# Patient Record
Sex: FEMALE | Race: WHITE | NOT HISPANIC OR LATINO | Employment: FULL TIME | URBAN - METROPOLITAN AREA
[De-identification: names, ages, dates, MRNs, and addresses within clinical notes are randomized per-mention and may not be internally consistent; named-entity substitution may affect disease eponyms.]

---

## 2018-01-04 ENCOUNTER — HOSPITAL ENCOUNTER (INPATIENT)
Facility: MEDICAL CENTER | Age: 48
LOS: 2 days | End: 2018-01-06
Attending: OBSTETRICS & GYNECOLOGY | Admitting: OBSTETRICS & GYNECOLOGY
Payer: COMMERCIAL

## 2018-01-04 DIAGNOSIS — Z98.890 POST-OPERATIVE STATE: ICD-10-CM

## 2018-01-04 LAB
ALBUMIN SERPL BCP-MCNC: 3 G/DL (ref 3.2–4.9)
ALBUMIN/GLOB SERPL: 1 G/DL
ALP SERPL-CCNC: 89 U/L (ref 30–99)
ALT SERPL-CCNC: 31 U/L (ref 2–50)
ANION GAP SERPL CALC-SCNC: 11 MMOL/L (ref 0–11.9)
APPEARANCE UR: ABNORMAL
APTT PPP: 31.3 SEC (ref 24.7–36)
AST SERPL-CCNC: 66 U/L (ref 12–45)
BACTERIA #/AREA URNS HPF: NEGATIVE /HPF
BASOPHILS # BLD AUTO: 0.2 % (ref 0–1.8)
BASOPHILS # BLD AUTO: 0.4 % (ref 0–1.8)
BASOPHILS # BLD: 0.03 K/UL (ref 0–0.12)
BASOPHILS # BLD: 0.05 K/UL (ref 0–0.12)
BILIRUB SERPL-MCNC: 0.6 MG/DL (ref 0.1–1.5)
BILIRUB UR QL STRIP.AUTO: NEGATIVE
BUN SERPL-MCNC: 13 MG/DL (ref 8–22)
CALCIUM SERPL-MCNC: 9.3 MG/DL (ref 8.5–10.5)
CHLORIDE SERPL-SCNC: 103 MMOL/L (ref 96–112)
CO2 SERPL-SCNC: 21 MMOL/L (ref 20–33)
COLOR UR: YELLOW
COMMENT 1642: NORMAL
CREAT SERPL-MCNC: 0.7 MG/DL (ref 0.5–1.4)
CREAT UR-MCNC: 149.2 MG/DL
EKG IMPRESSION: NORMAL
EOSINOPHIL # BLD AUTO: 0 K/UL (ref 0–0.51)
EOSINOPHIL # BLD AUTO: 0.02 K/UL (ref 0–0.51)
EOSINOPHIL NFR BLD: 0 % (ref 0–6.9)
EOSINOPHIL NFR BLD: 0.2 % (ref 0–6.9)
EPI CELLS #/AREA URNS HPF: ABNORMAL /HPF
ERYTHROCYTE [DISTWIDTH] IN BLOOD BY AUTOMATED COUNT: 42.6 FL (ref 35.9–50)
ERYTHROCYTE [DISTWIDTH] IN BLOOD BY AUTOMATED COUNT: 43.9 FL (ref 35.9–50)
FIBRINOGEN PPP-MCNC: 419 MG/DL (ref 215–460)
GFR SERPL CREATININE-BSD FRML MDRD: >60 ML/MIN/1.73 M 2
GLOBULIN SER CALC-MCNC: 2.9 G/DL (ref 1.9–3.5)
GLUCOSE SERPL-MCNC: 84 MG/DL (ref 65–99)
GLUCOSE UR STRIP.AUTO-MCNC: NEGATIVE MG/DL
HCT VFR BLD AUTO: 38.7 % (ref 37–47)
HCT VFR BLD AUTO: 41.6 % (ref 37–47)
HGB BLD-MCNC: 13.3 G/DL (ref 12–16)
HGB BLD-MCNC: 14.1 G/DL (ref 12–16)
HOLDING TUBE BB 8507: NORMAL
HYALINE CASTS #/AREA URNS LPF: ABNORMAL /LPF
IMM GRANULOCYTES # BLD AUTO: 0.07 K/UL (ref 0–0.11)
IMM GRANULOCYTES # BLD AUTO: 0.13 K/UL (ref 0–0.11)
IMM GRANULOCYTES NFR BLD AUTO: 0.5 % (ref 0–0.9)
IMM GRANULOCYTES NFR BLD AUTO: 1.1 % (ref 0–0.9)
INR PPP: 0.9 (ref 0.87–1.13)
KETONES UR STRIP.AUTO-MCNC: ABNORMAL MG/DL
LEUKOCYTE ESTERASE UR QL STRIP.AUTO: NEGATIVE
LYMPHOCYTES # BLD AUTO: 0.8 K/UL (ref 1–4.8)
LYMPHOCYTES # BLD AUTO: 1.14 K/UL (ref 1–4.8)
LYMPHOCYTES NFR BLD: 6.6 % (ref 22–41)
LYMPHOCYTES NFR BLD: 8.7 % (ref 22–41)
MAGNESIUM SERPL-MCNC: 1.6 MG/DL (ref 1.5–2.5)
MAGNESIUM SERPL-MCNC: 5.1 MG/DL (ref 1.5–2.5)
MAGNESIUM SERPL-MCNC: 6.5 MG/DL (ref 1.5–2.5)
MCH RBC QN AUTO: 30.7 PG (ref 27–33)
MCH RBC QN AUTO: 31.5 PG (ref 27–33)
MCHC RBC AUTO-ENTMCNC: 33.9 G/DL (ref 33.6–35)
MCHC RBC AUTO-ENTMCNC: 34.4 G/DL (ref 33.6–35)
MCV RBC AUTO: 90.6 FL (ref 81.4–97.8)
MCV RBC AUTO: 91.7 FL (ref 81.4–97.8)
MICRO URNS: ABNORMAL
MONOCYTES # BLD AUTO: 0.3 K/UL (ref 0–0.85)
MONOCYTES # BLD AUTO: 0.68 K/UL (ref 0–0.85)
MONOCYTES NFR BLD AUTO: 2.5 % (ref 0–13.4)
MONOCYTES NFR BLD AUTO: 5.2 % (ref 0–13.4)
MORPHOLOGY BLD-IMP: NORMAL
NEUTROPHILS # BLD AUTO: 10.9 K/UL (ref 2–7.15)
NEUTROPHILS # BLD AUTO: 11.21 K/UL (ref 2–7.15)
NEUTROPHILS NFR BLD: 85 % (ref 44–72)
NEUTROPHILS NFR BLD: 89.6 % (ref 44–72)
NITRITE UR QL STRIP.AUTO: NEGATIVE
NRBC # BLD AUTO: 0 K/UL
NRBC # BLD AUTO: 0 K/UL
NRBC BLD-RTO: 0 /100 WBC
NRBC BLD-RTO: 0 /100 WBC
PH UR STRIP.AUTO: 5 [PH]
PLATELET # BLD AUTO: 146 K/UL (ref 164–446)
PLATELET # BLD AUTO: 94 K/UL (ref 164–446)
PMV BLD AUTO: 11.8 FL (ref 9–12.9)
PMV BLD AUTO: 12.4 FL (ref 9–12.9)
POTASSIUM SERPL-SCNC: 3.8 MMOL/L (ref 3.6–5.5)
PROT SERPL-MCNC: 5.9 G/DL (ref 6–8.2)
PROT UR QL STRIP: >=1000 MG/DL
PROT UR-MCNC: 673.8 MG/DL (ref 0–15)
PROT/CREAT UR: 4516 MG/G (ref 10–107)
PROTHROMBIN TIME: 11.9 SEC (ref 12–14.6)
RBC # BLD AUTO: 4.22 M/UL (ref 4.2–5.4)
RBC # BLD AUTO: 4.59 M/UL (ref 4.2–5.4)
RBC # URNS HPF: ABNORMAL /HPF
RBC UR QL AUTO: ABNORMAL
RENAL EPI CELLS #/AREA URNS HPF: ABNORMAL /HPF
SODIUM SERPL-SCNC: 135 MMOL/L (ref 135–145)
SP GR UR STRIP.AUTO: 1.03
TRANS CELLS #/AREA URNS HPF: ABNORMAL /HPF
URATE SERPL-MCNC: 6.8 MG/DL (ref 1.9–8.2)
UROBILINOGEN UR STRIP.AUTO-MCNC: 0.2 MG/DL
WBC # BLD AUTO: 12.2 K/UL (ref 4.8–10.8)
WBC # BLD AUTO: 13.2 K/UL (ref 4.8–10.8)
WBC #/AREA URNS HPF: ABNORMAL /HPF

## 2018-01-04 PROCEDURE — 700105 HCHG RX REV CODE 258: Performed by: OBSTETRICS & GYNECOLOGY

## 2018-01-04 PROCEDURE — 304966 HCHG RECOVERY SVSC TIME ADDL 1/2 HR

## 2018-01-04 PROCEDURE — 88305 TISSUE EXAM BY PATHOLOGIST: CPT

## 2018-01-04 PROCEDURE — 82570 ASSAY OF URINE CREATININE: CPT

## 2018-01-04 PROCEDURE — 80053 COMPREHEN METABOLIC PANEL: CPT

## 2018-01-04 PROCEDURE — 700105 HCHG RX REV CODE 258: Performed by: ANESTHESIOLOGY

## 2018-01-04 PROCEDURE — 88300 SURGICAL PATH GROSS: CPT

## 2018-01-04 PROCEDURE — 83735 ASSAY OF MAGNESIUM: CPT

## 2018-01-04 PROCEDURE — 88302 TISSUE EXAM BY PATHOLOGIST: CPT

## 2018-01-04 PROCEDURE — 770002 HCHG ROOM/CARE - OB PRIVATE (112)

## 2018-01-04 PROCEDURE — 700111 HCHG RX REV CODE 636 W/ 250 OVERRIDE (IP): Performed by: OBSTETRICS & GYNECOLOGY

## 2018-01-04 PROCEDURE — 81001 URINALYSIS AUTO W/SCOPE: CPT

## 2018-01-04 PROCEDURE — 700101 HCHG RX REV CODE 250

## 2018-01-04 PROCEDURE — 85025 COMPLETE CBC W/AUTO DIFF WBC: CPT

## 2018-01-04 PROCEDURE — 700111 HCHG RX REV CODE 636 W/ 250 OVERRIDE (IP)

## 2018-01-04 PROCEDURE — A9270 NON-COVERED ITEM OR SERVICE: HCPCS | Performed by: OBSTETRICS & GYNECOLOGY

## 2018-01-04 PROCEDURE — 85730 THROMBOPLASTIN TIME PARTIAL: CPT

## 2018-01-04 PROCEDURE — 84550 ASSAY OF BLOOD/URIC ACID: CPT

## 2018-01-04 PROCEDURE — 304964 HCHG RECOVERY ROOM TIME 1HR

## 2018-01-04 PROCEDURE — 59514 CESAREAN DELIVERY ONLY: CPT

## 2018-01-04 PROCEDURE — 85610 PROTHROMBIN TIME: CPT

## 2018-01-04 PROCEDURE — 93005 ELECTROCARDIOGRAM TRACING: CPT | Performed by: OBSTETRICS & GYNECOLOGY

## 2018-01-04 PROCEDURE — 0UB90ZZ EXCISION OF UTERUS, OPEN APPROACH: ICD-10-PCS | Performed by: OBSTETRICS & GYNECOLOGY

## 2018-01-04 PROCEDURE — 306828 HCHG ANES-TIME GENERAL: Performed by: OBSTETRICS & GYNECOLOGY

## 2018-01-04 PROCEDURE — 36415 COLL VENOUS BLD VENIPUNCTURE: CPT

## 2018-01-04 PROCEDURE — 84156 ASSAY OF PROTEIN URINE: CPT

## 2018-01-04 PROCEDURE — 700101 HCHG RX REV CODE 250: Performed by: OBSTETRICS & GYNECOLOGY

## 2018-01-04 PROCEDURE — 700102 HCHG RX REV CODE 250 W/ 637 OVERRIDE(OP): Performed by: ANESTHESIOLOGY

## 2018-01-04 PROCEDURE — 85384 FIBRINOGEN ACTIVITY: CPT

## 2018-01-04 PROCEDURE — 700111 HCHG RX REV CODE 636 W/ 250 OVERRIDE (IP): Performed by: ANESTHESIOLOGY

## 2018-01-04 PROCEDURE — 700102 HCHG RX REV CODE 250 W/ 637 OVERRIDE(OP): Performed by: OBSTETRICS & GYNECOLOGY

## 2018-01-04 PROCEDURE — 305385 HCHG SURGICAL SERVICES 1/4 HOUR

## 2018-01-04 RX ORDER — SODIUM CHLORIDE, SODIUM LACTATE, POTASSIUM CHLORIDE, CALCIUM CHLORIDE 600; 310; 30; 20 MG/100ML; MG/100ML; MG/100ML; MG/100ML
INJECTION, SOLUTION INTRAVENOUS CONTINUOUS
Status: DISCONTINUED | OUTPATIENT
Start: 2018-01-04 | End: 2018-01-04

## 2018-01-04 RX ORDER — MAGNESIUM SULFATE HEPTAHYDRATE 40 MG/ML
INJECTION, SOLUTION INTRAVENOUS
Status: COMPLETED
Start: 2018-01-04 | End: 2018-01-04

## 2018-01-04 RX ORDER — DOCUSATE SODIUM 100 MG/1
100 CAPSULE, LIQUID FILLED ORAL 2 TIMES DAILY PRN
Status: DISCONTINUED | OUTPATIENT
Start: 2018-01-04 | End: 2018-01-06 | Stop reason: HOSPADM

## 2018-01-04 RX ORDER — OXYCODONE HYDROCHLORIDE AND ACETAMINOPHEN 5; 325 MG/1; MG/1
1-2 TABLET ORAL EVERY 6 HOURS PRN
Status: DISCONTINUED | OUTPATIENT
Start: 2018-01-04 | End: 2018-01-04

## 2018-01-04 RX ORDER — METOCLOPRAMIDE HYDROCHLORIDE 5 MG/ML
10 INJECTION INTRAMUSCULAR; INTRAVENOUS ONCE
Status: COMPLETED | OUTPATIENT
Start: 2018-01-04 | End: 2018-01-04

## 2018-01-04 RX ORDER — ONDANSETRON 2 MG/ML
4 INJECTION INTRAMUSCULAR; INTRAVENOUS EVERY 6 HOURS PRN
Status: DISCONTINUED | OUTPATIENT
Start: 2018-01-04 | End: 2018-01-04

## 2018-01-04 RX ORDER — DIPHENHYDRAMINE HCL 25 MG
25 TABLET ORAL EVERY 6 HOURS PRN
Status: DISCONTINUED | OUTPATIENT
Start: 2018-01-04 | End: 2018-01-04

## 2018-01-04 RX ORDER — ONDANSETRON 2 MG/ML
4 INJECTION INTRAMUSCULAR; INTRAVENOUS ONCE
Status: CANCELLED | OUTPATIENT
Start: 2018-01-04 | End: 2018-01-05

## 2018-01-04 RX ORDER — MORPHINE SULFATE 4 MG/ML
4 INJECTION, SOLUTION INTRAMUSCULAR; INTRAVENOUS
Status: DISCONTINUED | OUTPATIENT
Start: 2018-01-04 | End: 2018-01-04

## 2018-01-04 RX ORDER — DIPHENHYDRAMINE HYDROCHLORIDE 50 MG/ML
25 INJECTION INTRAMUSCULAR; INTRAVENOUS EVERY 6 HOURS PRN
Status: DISCONTINUED | OUTPATIENT
Start: 2018-01-05 | End: 2018-01-06 | Stop reason: HOSPADM

## 2018-01-04 RX ORDER — KETOROLAC TROMETHAMINE 30 MG/ML
30 INJECTION, SOLUTION INTRAMUSCULAR; INTRAVENOUS EVERY 6 HOURS
Status: DISCONTINUED | OUTPATIENT
Start: 2018-01-04 | End: 2018-01-05

## 2018-01-04 RX ORDER — NALOXONE HYDROCHLORIDE 0.4 MG/ML
0.1 INJECTION, SOLUTION INTRAMUSCULAR; INTRAVENOUS; SUBCUTANEOUS PRN
Status: CANCELLED | OUTPATIENT
Start: 2018-01-04

## 2018-01-04 RX ORDER — IBUPROFEN 600 MG/1
600 TABLET ORAL EVERY 6 HOURS PRN
Status: DISCONTINUED | OUTPATIENT
Start: 2018-01-05 | End: 2018-01-06 | Stop reason: HOSPADM

## 2018-01-04 RX ORDER — DIPHENHYDRAMINE HYDROCHLORIDE 50 MG/ML
12.5 INJECTION INTRAMUSCULAR; INTRAVENOUS EVERY 6 HOURS PRN
Status: DISCONTINUED | OUTPATIENT
Start: 2018-01-04 | End: 2018-01-05

## 2018-01-04 RX ORDER — OXYCODONE AND ACETAMINOPHEN 10; 325 MG/1; MG/1
1 TABLET ORAL EVERY 4 HOURS PRN
Status: DISCONTINUED | OUTPATIENT
Start: 2018-01-04 | End: 2018-01-05

## 2018-01-04 RX ORDER — ONDANSETRON 4 MG/1
4 TABLET, ORALLY DISINTEGRATING ORAL EVERY 6 HOURS PRN
Status: DISCONTINUED | OUTPATIENT
Start: 2018-01-04 | End: 2018-01-04

## 2018-01-04 RX ORDER — SODIUM CHLORIDE, SODIUM LACTATE, POTASSIUM CHLORIDE, CALCIUM CHLORIDE 600; 310; 30; 20 MG/100ML; MG/100ML; MG/100ML; MG/100ML
INJECTION, SOLUTION INTRAVENOUS CONTINUOUS
Status: DISCONTINUED | OUTPATIENT
Start: 2018-01-04 | End: 2018-01-04 | Stop reason: HOSPADM

## 2018-01-04 RX ORDER — HYDRALAZINE HYDROCHLORIDE 20 MG/ML
20 INJECTION INTRAMUSCULAR; INTRAVENOUS ONCE
Status: COMPLETED | OUTPATIENT
Start: 2018-01-04 | End: 2018-01-04

## 2018-01-04 RX ORDER — KETOROLAC TROMETHAMINE 30 MG/ML
30 INJECTION, SOLUTION INTRAMUSCULAR; INTRAVENOUS EVERY 6 HOURS
Status: DISCONTINUED | OUTPATIENT
Start: 2018-01-04 | End: 2018-01-04

## 2018-01-04 RX ORDER — OXYCODONE HYDROCHLORIDE 5 MG/1
5 TABLET ORAL EVERY 4 HOURS PRN
Status: DISCONTINUED | OUTPATIENT
Start: 2018-01-04 | End: 2018-01-04 | Stop reason: HOSPADM

## 2018-01-04 RX ORDER — MORPHINE SULFATE 4 MG/ML
4 INJECTION, SOLUTION INTRAMUSCULAR; INTRAVENOUS
Status: DISCONTINUED | OUTPATIENT
Start: 2018-01-05 | End: 2018-01-06 | Stop reason: HOSPADM

## 2018-01-04 RX ORDER — LABETALOL HYDROCHLORIDE 5 MG/ML
INJECTION, SOLUTION INTRAVENOUS
Status: COMPLETED
Start: 2018-01-04 | End: 2018-01-04

## 2018-01-04 RX ORDER — DIPHENHYDRAMINE HCL 25 MG
25 TABLET ORAL EVERY 6 HOURS PRN
Status: DISCONTINUED | OUTPATIENT
Start: 2018-01-05 | End: 2018-01-06 | Stop reason: HOSPADM

## 2018-01-04 RX ORDER — METOCLOPRAMIDE HYDROCHLORIDE 5 MG/ML
10 INJECTION INTRAMUSCULAR; INTRAVENOUS EVERY 6 HOURS PRN
Status: DISCONTINUED | OUTPATIENT
Start: 2018-01-04 | End: 2018-01-04

## 2018-01-04 RX ORDER — ONDANSETRON 2 MG/ML
4 INJECTION INTRAMUSCULAR; INTRAVENOUS ONCE
Status: COMPLETED | OUTPATIENT
Start: 2018-01-04 | End: 2018-01-04

## 2018-01-04 RX ORDER — SIMETHICONE 80 MG
80 TABLET,CHEWABLE ORAL 4 TIMES DAILY PRN
Status: DISCONTINUED | OUTPATIENT
Start: 2018-01-04 | End: 2018-01-06 | Stop reason: HOSPADM

## 2018-01-04 RX ORDER — MAGNESIUM SULFATE HEPTAHYDRATE 40 MG/ML
4 INJECTION, SOLUTION INTRAVENOUS ONCE
Status: CANCELLED | OUTPATIENT
Start: 2018-01-04 | End: 2018-01-04

## 2018-01-04 RX ORDER — HYDROMORPHONE HYDROCHLORIDE 2 MG/ML
0.4 INJECTION, SOLUTION INTRAMUSCULAR; INTRAVENOUS; SUBCUTANEOUS
Status: DISCONTINUED | OUTPATIENT
Start: 2018-01-04 | End: 2018-01-05

## 2018-01-04 RX ORDER — MAGNESIUM SULFATE HEPTAHYDRATE 40 MG/ML
2 INJECTION, SOLUTION INTRAVENOUS CONTINUOUS
Status: DISCONTINUED | OUTPATIENT
Start: 2018-01-04 | End: 2018-01-04

## 2018-01-04 RX ORDER — MISOPROSTOL 200 UG/1
800 TABLET ORAL
Status: DISCONTINUED | OUTPATIENT
Start: 2018-01-04 | End: 2018-01-06 | Stop reason: HOSPADM

## 2018-01-04 RX ORDER — OXYCODONE HYDROCHLORIDE 10 MG/1
10 TABLET ORAL EVERY 4 HOURS PRN
Status: DISCONTINUED | OUTPATIENT
Start: 2018-01-04 | End: 2018-01-04 | Stop reason: HOSPADM

## 2018-01-04 RX ORDER — KETOROLAC TROMETHAMINE 30 MG/ML
30 INJECTION, SOLUTION INTRAMUSCULAR; INTRAVENOUS EVERY 6 HOURS
Status: CANCELLED | OUTPATIENT
Start: 2018-01-04 | End: 2018-01-05

## 2018-01-04 RX ORDER — LABETALOL HYDROCHLORIDE 5 MG/ML
40 INJECTION, SOLUTION INTRAVENOUS ONCE
Status: COMPLETED | OUTPATIENT
Start: 2018-01-04 | End: 2018-01-04

## 2018-01-04 RX ORDER — MAGNESIUM SULFATE HEPTAHYDRATE 40 MG/ML
2 INJECTION, SOLUTION INTRAVENOUS ONCE
Status: CANCELLED | OUTPATIENT
Start: 2018-01-04 | End: 2018-01-04

## 2018-01-04 RX ORDER — LABETALOL HYDROCHLORIDE 5 MG/ML
20 INJECTION, SOLUTION INTRAVENOUS ONCE
Status: COMPLETED | OUTPATIENT
Start: 2018-01-04 | End: 2018-01-04

## 2018-01-04 RX ORDER — OXYCODONE HYDROCHLORIDE AND ACETAMINOPHEN 5; 325 MG/1; MG/1
1 TABLET ORAL EVERY 4 HOURS PRN
Status: DISCONTINUED | OUTPATIENT
Start: 2018-01-04 | End: 2018-01-05

## 2018-01-04 RX ORDER — VITAMIN A ACETATE, BETA CAROTENE, ASCORBIC ACID, CHOLECALCIFEROL, .ALPHA.-TOCOPHEROL ACETATE, DL-, THIAMINE MONONITRATE, RIBOFLAVIN, NIACINAMIDE, PYRIDOXINE HYDROCHLORIDE, FOLIC ACID, CYANOCOBALAMIN, CALCIUM CARBONATE, FERROUS FUMARATE, ZINC OXIDE, CUPRIC OXIDE 3080; 12; 120; 400; 1; 1.84; 3; 20; 22; 920; 25; 200; 27; 10; 2 [IU]/1; UG/1; MG/1; [IU]/1; MG/1; MG/1; MG/1; MG/1; MG/1; [IU]/1; MG/1; MG/1; MG/1; MG/1; MG/1
1 TABLET, FILM COATED ORAL EVERY MORNING
Status: DISCONTINUED | OUTPATIENT
Start: 2018-01-05 | End: 2018-01-06 | Stop reason: HOSPADM

## 2018-01-04 RX ORDER — MAGNESIUM SULFATE HEPTAHYDRATE 40 MG/ML
4 INJECTION, SOLUTION INTRAVENOUS ONCE
Status: COMPLETED | OUTPATIENT
Start: 2018-01-04 | End: 2018-01-04

## 2018-01-04 RX ORDER — DIPHENHYDRAMINE HYDROCHLORIDE 50 MG/ML
25 INJECTION INTRAMUSCULAR; INTRAVENOUS EVERY 6 HOURS PRN
Status: DISCONTINUED | OUTPATIENT
Start: 2018-01-04 | End: 2018-01-04

## 2018-01-04 RX ORDER — IBUPROFEN 600 MG/1
600 TABLET ORAL EVERY 6 HOURS PRN
Status: DISCONTINUED | OUTPATIENT
Start: 2018-01-04 | End: 2018-01-04

## 2018-01-04 RX ORDER — METOCLOPRAMIDE HYDROCHLORIDE 5 MG/ML
10 INJECTION INTRAMUSCULAR; INTRAVENOUS EVERY 6 HOURS PRN
Status: DISCONTINUED | OUTPATIENT
Start: 2018-01-04 | End: 2018-01-05

## 2018-01-04 RX ORDER — IBUPROFEN 600 MG/1
600 TABLET ORAL EVERY 6 HOURS PRN
Status: DISCONTINUED | OUTPATIENT
Start: 2018-01-05 | End: 2018-01-04

## 2018-01-04 RX ORDER — ONDANSETRON 4 MG/1
4 TABLET, ORALLY DISINTEGRATING ORAL EVERY 6 HOURS PRN
Status: DISCONTINUED | OUTPATIENT
Start: 2018-01-05 | End: 2018-01-06 | Stop reason: HOSPADM

## 2018-01-04 RX ORDER — LABETALOL HYDROCHLORIDE 5 MG/ML
10 INJECTION, SOLUTION INTRAVENOUS
Status: DISCONTINUED | OUTPATIENT
Start: 2018-01-04 | End: 2018-01-05

## 2018-01-04 RX ORDER — METOCLOPRAMIDE HYDROCHLORIDE 5 MG/ML
10 INJECTION INTRAMUSCULAR; INTRAVENOUS EVERY 6 HOURS PRN
Status: DISCONTINUED | OUTPATIENT
Start: 2018-01-05 | End: 2018-01-05

## 2018-01-04 RX ORDER — LEVOTHYROXINE SODIUM 0.05 MG/1
50 TABLET ORAL
COMMUNITY
End: 2022-02-24

## 2018-01-04 RX ORDER — HYDRALAZINE HYDROCHLORIDE 20 MG/ML
INJECTION INTRAMUSCULAR; INTRAVENOUS
Status: COMPLETED
Start: 2018-01-04 | End: 2018-01-04

## 2018-01-04 RX ORDER — ONDANSETRON 2 MG/ML
4 INJECTION INTRAMUSCULAR; INTRAVENOUS EVERY 6 HOURS PRN
Status: DISCONTINUED | OUTPATIENT
Start: 2018-01-04 | End: 2018-01-05

## 2018-01-04 RX ORDER — ACETAMINOPHEN 325 MG/1
325 TABLET ORAL EVERY 4 HOURS PRN
Status: DISCONTINUED | OUTPATIENT
Start: 2018-01-04 | End: 2018-01-04 | Stop reason: HOSPADM

## 2018-01-04 RX ORDER — ONDANSETRON 2 MG/ML
4 INJECTION INTRAMUSCULAR; INTRAVENOUS EVERY 6 HOURS PRN
Status: DISCONTINUED | OUTPATIENT
Start: 2018-01-05 | End: 2018-01-06 | Stop reason: HOSPADM

## 2018-01-04 RX ORDER — SODIUM CHLORIDE, SODIUM LACTATE, POTASSIUM CHLORIDE, CALCIUM CHLORIDE 600; 310; 30; 20 MG/100ML; MG/100ML; MG/100ML; MG/100ML
1500 INJECTION, SOLUTION INTRAVENOUS ONCE
Status: COMPLETED | OUTPATIENT
Start: 2018-01-04 | End: 2018-01-04

## 2018-01-04 RX ORDER — HYDROMORPHONE HYDROCHLORIDE 2 MG/ML
0.2 INJECTION, SOLUTION INTRAMUSCULAR; INTRAVENOUS; SUBCUTANEOUS
Status: DISCONTINUED | OUTPATIENT
Start: 2018-01-04 | End: 2018-01-05

## 2018-01-04 RX ORDER — ONDANSETRON 2 MG/ML
INJECTION INTRAMUSCULAR; INTRAVENOUS
Status: COMPLETED
Start: 2018-01-04 | End: 2018-01-04

## 2018-01-04 RX ORDER — CITRIC ACID/SODIUM CITRATE 334-500MG
30 SOLUTION, ORAL ORAL ONCE
Status: COMPLETED | OUTPATIENT
Start: 2018-01-04 | End: 2018-01-04

## 2018-01-04 RX ORDER — SODIUM CHLORIDE, SODIUM LACTATE, POTASSIUM CHLORIDE, CALCIUM CHLORIDE 600; 310; 30; 20 MG/100ML; MG/100ML; MG/100ML; MG/100ML
INJECTION, SOLUTION INTRAVENOUS PRN
Status: DISCONTINUED | OUTPATIENT
Start: 2018-01-04 | End: 2018-01-06 | Stop reason: HOSPADM

## 2018-01-04 RX ADMIN — MAGNESIUM SULFATE HEPTAHYDRATE 4 G: 40 INJECTION, SOLUTION INTRAVENOUS at 01:19

## 2018-01-04 RX ADMIN — MAGNESIUM SULFATE IN WATER 20 G: 40 INJECTION, SOLUTION INTRAVENOUS at 20:54

## 2018-01-04 RX ADMIN — ONDANSETRON 4 MG: 2 INJECTION INTRAMUSCULAR; INTRAVENOUS at 07:42

## 2018-01-04 RX ADMIN — IBUPROFEN 600 MG: 600 TABLET, FILM COATED ORAL at 06:40

## 2018-01-04 RX ADMIN — HYDRALAZINE HYDROCHLORIDE 20 MG: 20 INJECTION INTRAMUSCULAR; INTRAVENOUS at 01:42

## 2018-01-04 RX ADMIN — MAGNESIUM SULFATE IN WATER 20 G: 40 INJECTION, SOLUTION INTRAVENOUS at 12:22

## 2018-01-04 RX ADMIN — LABETALOL HYDROCHLORIDE 40 MG: 5 INJECTION, SOLUTION INTRAVENOUS at 01:17

## 2018-01-04 RX ADMIN — MAGNESIUM SULFATE HEPTAHYDRATE 2 G/HR: 40 INJECTION, SOLUTION INTRAVENOUS at 01:27

## 2018-01-04 RX ADMIN — SODIUM CHLORIDE, POTASSIUM CHLORIDE, SODIUM LACTATE AND CALCIUM CHLORIDE: 600; 310; 30; 20 INJECTION, SOLUTION INTRAVENOUS at 16:24

## 2018-01-04 RX ADMIN — SODIUM CITRATE AND CITRIC ACID MONOHYDRATE 30 ML: 500; 334 SOLUTION ORAL at 02:33

## 2018-01-04 RX ADMIN — SODIUM CHLORIDE, POTASSIUM CHLORIDE, SODIUM LACTATE AND CALCIUM CHLORIDE: 600; 310; 30; 20 INJECTION, SOLUTION INTRAVENOUS at 05:35

## 2018-01-04 RX ADMIN — METOCLOPRAMIDE 10 MG: 5 INJECTION, SOLUTION INTRAMUSCULAR; INTRAVENOUS at 09:51

## 2018-01-04 RX ADMIN — SODIUM CHLORIDE, POTASSIUM CHLORIDE, SODIUM LACTATE AND CALCIUM CHLORIDE 1000 ML: 600; 310; 30; 20 INJECTION, SOLUTION INTRAVENOUS at 02:37

## 2018-01-04 RX ADMIN — DIPHENHYDRAMINE HYDROCHLORIDE 12.5 MG: 50 INJECTION, SOLUTION INTRAMUSCULAR; INTRAVENOUS at 21:29

## 2018-01-04 RX ADMIN — OXYCODONE AND ACETAMINOPHEN 1 TABLET: 5; 325 TABLET ORAL at 06:40

## 2018-01-04 RX ADMIN — MAGNESIUM SULFATE IN WATER 4 G: 40 INJECTION, SOLUTION INTRAVENOUS at 01:19

## 2018-01-04 RX ADMIN — SODIUM CHLORIDE, POTASSIUM CHLORIDE, SODIUM LACTATE AND CALCIUM CHLORIDE: 600; 310; 30; 20 INJECTION, SOLUTION INTRAVENOUS at 01:18

## 2018-01-04 RX ADMIN — FAMOTIDINE 20 MG: 10 INJECTION, SOLUTION INTRAVENOUS at 02:34

## 2018-01-04 RX ADMIN — LABETALOL HYDROCHLORIDE 20 MG: 5 INJECTION, SOLUTION INTRAVENOUS at 00:50

## 2018-01-04 RX ADMIN — ONDANSETRON 4 MG: 2 INJECTION INTRAMUSCULAR; INTRAVENOUS at 15:44

## 2018-01-04 RX ADMIN — METOCLOPRAMIDE 10 MG: 5 INJECTION, SOLUTION INTRAMUSCULAR; INTRAVENOUS at 02:34

## 2018-01-04 RX ADMIN — KETOROLAC TROMETHAMINE 30 MG: 30 INJECTION, SOLUTION INTRAMUSCULAR at 19:18

## 2018-01-04 ASSESSMENT — LIFESTYLE VARIABLES
ALCOHOL_USE: NO
EVER_SMOKED: NEVER
DO YOU DRINK ALCOHOL: NO

## 2018-01-04 ASSESSMENT — PAIN SCALES - GENERAL
PAINLEVEL_OUTOF10: 3
PAINLEVEL_OUTOF10: 0
PAINLEVEL_OUTOF10: 0
PAINLEVEL_OUTOF10: 10
PAINLEVEL_OUTOF10: 10
PAINLEVEL_OUTOF10: 3
PAINLEVEL_OUTOF10: 0

## 2018-01-04 NOTE — OP REPORT
"DATE OF SERVICE:  2018    OPERATIONS:  1.  Repeat low transverse  section.  2.  Sharp uterine curettage.    SURGEON:  Primitivo Oconnor MD.    ASSISTANT:  Corinne E. Capurro, MD.    ANESTHESIOLOGIST:  Julio Thompson DO.    ANESTHESIA:  Spinal.    PREOPERATIVE DIAGNOSES:  1.  A 34 and 5/7th week gestation.  2.  Preeclampsia with severe features.  3.  Prior  section.  4.  thrombocytopenia    POSTOPERATIVE DIAGNOSES:  1.  A 34 and 5/7th week gestation.  2.  Preeclampsia with severe features.  3.  Prior  section.  4.  Fetus papyraceus.  5.  Placenta accreta.  6.  thrombocytopenia    COMPLICATIONS:  None.    ESTIMATED BLOOD LOSS:  800 mL.    SPECIMENS SENT TO PATHOLOGY:  Placenta and fetus papyraceus.    TRANSFUSIONS:  None.    BABY:  Male, 1-minute Apgar 8 , 5-minute Apgar 9, weight 2050 g.    INDICATIONS:  This is a 47-year-old lady who is now G2, P2.  We do not have   prenatal records, but according to her verbal  history her due date is 02/10/2018   making her EGA 34 and 5/7th weeks.  She reports unusual visual symptoms   including \"prisms\" and scotomata for over 1 day.  She came to labor and   delivery because of dyspnea as well as upper back pain.  She denied abdominal   pain.  She denied headache.  Fetal monitoring was reassuring.  She was   severely hypertensive.  Her initial pressure was 211/96.  Her blood pressure   was eventually controlled with 20 mg of labetalol, then 40 mg of labetalol,   and eventually 20 mg of IV hydralazine.  Once her blood pressure was   controlled and her labs were back, we moved towards expeditious .    She did have a platelet count of 94,000 and elevated liver enzymes,  with an AST   of 66.    We were initially concerned about anuria.  We thought that we placed the Alegre   catheter properly, but the bag did not have any urine in it.  However, further   assessment revealed that we had not placed the Alegre properly.  Then, we   placed the " Alegre properly and clear urine immediately spilled into the tube and    the bag.  Her BUN was 13 and creatinine was 0.7.    OPERATION:  The patient went to the OR, spinal anesthesia was administered.    She was prepped and draped.  A time-out was done.  I made a small Pfannenstiel   skin incision by excising her old Pfannenstiel scar with elliptical   incisions.  I incised a thin layer of subcutaneous fat.  I incised the rectus   abdominis fascia transversely.  I cleared the fascia from the rectus muscles   with scissors.  I entered the peritoneum in the midline well away from the   bladder.  The peritoneal incision was enlarged.  Hilton O retractor was   placed.  The uterus was intact.  There were some anterior cul-de-sac   adhesions, freed up with scissors;  I cleared the bladder from lower   uterine segment with scissors.    I made a low transverse myometrial incision.  I pierced the membranes with a   blunt instrument, encountering clear amniotic fluid.  The baby's head was   extracted from right occiput anterior position with no difficulty.  I bulb   suctioned the baby's mouth and nares.  I wrapped the baby in a towel and a   plastic bag for 1 minute.  After this delayed cord clamping, I triply clamped   and cut the cord and handed the baby off.  Cord gases were sent.    Fundal massage and gentle cord traction failed to deliver the placenta.  I   manually extracted a fair amount of the placenta, but there were multiple   tenaciously adherent placental fragments, cotyledons, and membrane   fragments in the fundus, requiring extensive sharp curettage.  I did note the   presence of a fetus papyraceus.  This was sent to pathology along with all of   the placental fragments.  We spent quite some time manually and instrumentally   curetting all aspects of the uterine fundus until I was satisfied that all of   the placental fragments were excised.    I closed the full thickness of myometrium with running interlocking  0 Vicryl   suture.  I placed a second 0 Vicryl layer, reperitonealizing the anterior   cul-de-sac.  Both fallopian tubes and ovaries were inspected and appeared   normal.  I closed the anterior parietal peritoneum and the posterior layer of   the rectus sheath with running 2-0 Vicryl.  I closed both layers of the rectus   abdominis fascia with running 0 PDS.  I closed Chan's fascia with running   3-0 Vicryl.  I closed the skin with INSORB resorbable subcutaneous staples.    Steri-Strips and a Mepilex dressing were placed.  Sponge and needle counts   were correct.       ____________________________________     MD MATTHEW HDEZ / VASILE    DD:  01/04/2018 04:37:09  DT:  01/04/2018 05:05:21    D#:  [id number]  Job#:  [id number]

## 2018-01-04 NOTE — PROGRESS NOTES
Late entry    Pt arrived to L&D. Prenatals unavailable at the time. Pt reported she is a  with at EMILY of 2/10 making her 34w5d. Pt c/o severe upper chest and back pain, +FM, -VB, -LOF and -UCs at this time. Upon assessment pt had rebound abdominal tenderness, clonus, hyperreflexia, vision changes and severe hypertension. Pt reported no CELIS. Dr Oconnor called to bedside to evaluate. ORders received for EKG, bedside US, IV, labs for preeclampsia workup and labetalol and hydralazine (see mar). Awaiting lab results. After lab results and further evaluation. Dr Oconnor called a repeat  section for preeclampsia at 0200. Pt prepped for  and transported to OR at 0240.     0500- Lab notified about IUP 34w5d placenta being sent down through the dumwaiter. Nika in lab stated she would be there to pick it up.

## 2018-01-04 NOTE — PROGRESS NOTES
Patient transferred from l and d  pacu to  334. Patient denies need for pain medication but is nauseated medication orders put in. Assessment done lochia light fundus firm . Dressing dry and intact.Patient oriented to room and post op and postpartum routine.Patient oriented to [employer] system.Alegre patent with clear yellow urine. I V patent ,without redness or drainage with lr running at 125/hr on pump and magnesium sulfate running on 2nd pump at 2grams /hr.patient c/o feeling flushed and has a dry mouth but declines visual disturbances or RUQ pain.no nasal stuffiness or shortness of breath. Reflexes 2+ bilaterally lower extremities and no clonus.Magnesium side effects and routine discussed. Sequentials on and incentive spirometer at bedside. Any questions answered. Baby in nicu.

## 2018-01-04 NOTE — H&P
"CHIEF COMPLAINT:  Visual symptoms and dyspnea.    HISTORY OF PRESENT ILLNESS:  The patient is a 47-year-old lady, G2, P1.  We do   not have her prenatal records, but she states that her EMILY is 2/10/2018,   which would make her EGA 34-5/7 weeks.  She has had prenatal care with Dr. Perera.  She has seen Dr. Caitlin Maier in consultation.  She reports a   perfectly healthy pregnancy until the last couple of days.  Yesterday, she   began \"seen prisms\" and having some unusual visual symptoms including   scotomata.  Today she became short of breath.  She began having bilateral   upper back pain.  She denies abdominal pain.  She denies headache.  She has   had no vaginal bleeding.  Fetal movement has been present, but decreased.  On   evaluation, she has preeclampsia with severe features, and hypertensive crisis.  Her initial blood pressure was   211/96.  We promptly treated her with labetalol 20 mg IV with minimal effect,   so we gave her 40 mg labetalol, which had some effect, but we finally got a   good blood pressure control with 20 mg of IV hydralazine.  Presently, blood   pressure is 139/71.    Laboratory evaluation reveals thrombocytopenia with platelet count of 94,000,   elevated AST of 66, BUN 13, creatinine 0.7.  Coags are normal.  In spite of   normal BUN and creatinine when we placed a Alegre catheter in preparation for    we noted no urine in the bag, so we suspect intravascular volume   Depletion; IV hydration is in progress in anticipation of .  The   baby does present cephalically.  She had 1 prior  in 2015 for   malpresentation.      OBSTETRICAL HISTORY:  Primary  for malpresentation .    PAST MEDICAL HISTORY:  Unremarkable.  She specifically denies any history of   heart, lung, liver, or thromboembolic disease.    PAST SURGICAL HISTORY:  Primary  2015.  She had some sort of foot   surgery as a small child.    ALLERGIES:  No known drug " allergies.    SOCIAL HISTORY:  She is a hairdresser.  She denies alcohol, tobacco or drug   consumption.    PHYSICAL EXAMINATION:  VITAL SIGNS:  Temperature 97.2, pulse 71, respirations 16, O2 saturation on   room air is 99%.  Initial blood pressure 211/96.  Subsequently, 186/91 161/86,   169/86 and most recently 139/71 after labetalol and hydralazine.  HEENT:  Normal.  LUNGS:  Clear.  HEART:  Sounds normal.  ABDOMEN:  Nontender.  No hepatosplenomegaly.  Uterus is nontender.  PELVIC:  Not done.  EXTREMITIES:  3+ bipedal edema.  Homans negative bilaterally.  Prior to   starting magnesium sulfate seizure prophylaxis her reflexes were extremely   brisk with 3 beats of clonus.    Fetal monitoring is reassuring at this point.  Bedside ultrasound reveals   cephalic presentation, normal amniotic fluid volume with a left fundal   placenta with no sonographic evidence of placental abruption.    LABORATORY DATA:  Hemoglobin 14.1, hematocrit 41.6, white blood count 13,200,   platelet count 94,000.  Electrolytes are within normal limits.  BUN 13,   creatinine 0.7, AST 66, ALT 31, uric acid 6.8.  PT 11.9 seconds, PTT 31.3   seconds.  Fibrinogen 419.  EKG is within normal limits.  There are no ST   segment changes.  There are no inverted T waves.    DIAGNOSES:  1.  A 34-5/7 week gestation.  2.  Prior  section.  3.  Preeclampsia with severe features, acute hypertensive crisis, thrombocytopenia,   elevated liver enzymes, and oliguria.    PLAN:  After stabilization and hydration, proceed with an expeditious   .  Magnesium sulfate seizure prophylaxis is already underway, IV   hydration is underway.  She has not been planning sterilization;  I do not   think this is the time to make a spur of the moment decision in that regard.       ____________________________________     MD MATTHEW HDEZ / VASILE    DD:  2018 02:17:56  DT:  2018 03:01:25    D#:  [id number]  Job#:  [id number]

## 2018-01-04 NOTE — CONSULTS
Importance of initiating breast stimulation and pumping discussed with mother, Hilton. She has a history of low milk production, tried using Reglan but did not tolerate side effects well. Explained colostrum swabbing for oral care for infant.

## 2018-01-04 NOTE — PROGRESS NOTES
ADMIT H AND P DICTATED    34 5/7 wks  Severe preeclampsia---MgSO4 therapy begun  Hypertensive crisis---labetalol 20 then 40 mg, hydralazine 20 mg IV---now  BP  139/71  Thrombocytopenia---94  Elevated AST---66  Oliguria---no urine in julio bag, BUN and creat WNL---hydration in progress  Prior     PLAN:      Stabilize, hydrate, MgSO4, hydralazine PRN, deliver by expeditious , PP MgSO4.

## 2018-01-04 NOTE — OR SURGEON
Immediate Post OP Note    PreOp Diagnosis:     34 5/7 wks  Severe preeclampsia  Prior       PostOp Diagnosis:     Same, plus  Fetus papyraceus  Placenta accreta    Procedure(s):    Repeat LTCS  Sharp curettage    Surgeon(s):  Bruce E Farringer, M.D. Corinne E Capurro, M.D.    Anesthesiologist/Type of Anesthesia:  No anesthesia staff entered./Spinal    Surgical Staff:  * No surgical staff found *    Specimens:  Placenta and fetus papyraceus    Estimated Blood Loss: 800 cc    Findings:     Baby:  Male, APGARs 8-9, 2050 grams  Placenta accreta--- fundus    Complications: none    PLAN:  PP MgSO4, labetalol PRN.        2018 4:12 AM Primitivo Oconnor

## 2018-01-04 NOTE — PROGRESS NOTES
0700 - Report received from DEBBY An RN. Pt resting in [facility] s/p c/s. Alegre in place. Magnesium running per active order. Pt rating her pain at 5-6 on scale of 0-10. Pt reporting mild dizziness after sitting up, head of bed lowered to pt satisfaction. POC discussed with pt, questions answered.   0740 - Dr. Oconnor updated. No new orders.   0800 - Home from Lab called with critical result of magnesium at 5.1. Critical lab result read back to Home.   This critical lab result is within parameters established by  for this patient  0830 - Pt transferred from PACU bed 3 to S 334 via [facility] in stable condition, possessions at bedside. Report to COREEN Sullivan

## 2018-01-05 LAB
ERYTHROCYTE [DISTWIDTH] IN BLOOD BY AUTOMATED COUNT: 44.7 FL (ref 35.9–50)
HCT VFR BLD AUTO: 37 % (ref 37–47)
HGB BLD-MCNC: 12.6 G/DL (ref 12–16)
MCH RBC QN AUTO: 31.3 PG (ref 27–33)
MCHC RBC AUTO-ENTMCNC: 34.1 G/DL (ref 33.6–35)
MCV RBC AUTO: 91.8 FL (ref 81.4–97.8)
PLATELET # BLD AUTO: 187 K/UL (ref 164–446)
PMV BLD AUTO: 11.6 FL (ref 9–12.9)
RBC # BLD AUTO: 4.03 M/UL (ref 4.2–5.4)
WBC # BLD AUTO: 13.1 K/UL (ref 4.8–10.8)

## 2018-01-05 PROCEDURE — 700112 HCHG RX REV CODE 229: Performed by: OBSTETRICS & GYNECOLOGY

## 2018-01-05 PROCEDURE — A9270 NON-COVERED ITEM OR SERVICE: HCPCS | Performed by: OBSTETRICS & GYNECOLOGY

## 2018-01-05 PROCEDURE — 700102 HCHG RX REV CODE 250 W/ 637 OVERRIDE(OP): Performed by: OBSTETRICS & GYNECOLOGY

## 2018-01-05 PROCEDURE — 770002 HCHG ROOM/CARE - OB PRIVATE (112)

## 2018-01-05 PROCEDURE — 36415 COLL VENOUS BLD VENIPUNCTURE: CPT

## 2018-01-05 PROCEDURE — 85027 COMPLETE CBC AUTOMATED: CPT

## 2018-01-05 PROCEDURE — 700102 HCHG RX REV CODE 250 W/ 637 OVERRIDE(OP): Performed by: ANESTHESIOLOGY

## 2018-01-05 PROCEDURE — A9270 NON-COVERED ITEM OR SERVICE: HCPCS | Performed by: ANESTHESIOLOGY

## 2018-01-05 RX ORDER — OXYCODONE HYDROCHLORIDE AND ACETAMINOPHEN 5; 325 MG/1; MG/1
1 TABLET ORAL EVERY 4 HOURS PRN
Status: DISCONTINUED | OUTPATIENT
Start: 2018-01-05 | End: 2018-01-06 | Stop reason: HOSPADM

## 2018-01-05 RX ORDER — OXYCODONE AND ACETAMINOPHEN 10; 325 MG/1; MG/1
1 TABLET ORAL EVERY 4 HOURS PRN
Status: DISCONTINUED | OUTPATIENT
Start: 2018-01-05 | End: 2018-01-06 | Stop reason: HOSPADM

## 2018-01-05 RX ADMIN — IBUPROFEN 600 MG: 600 TABLET, FILM COATED ORAL at 23:53

## 2018-01-05 RX ADMIN — OXYCODONE HYDROCHLORIDE AND ACETAMINOPHEN 1 TABLET: 10; 325 TABLET ORAL at 05:41

## 2018-01-05 RX ADMIN — OXYCODONE HYDROCHLORIDE AND ACETAMINOPHEN 1 TABLET: 5; 325 TABLET ORAL at 17:19

## 2018-01-05 RX ADMIN — IBUPROFEN 600 MG: 600 TABLET, FILM COATED ORAL at 17:18

## 2018-01-05 RX ADMIN — OXYCODONE HYDROCHLORIDE AND ACETAMINOPHEN 1 TABLET: 10; 325 TABLET ORAL at 21:36

## 2018-01-05 RX ADMIN — Medication 1 TABLET: at 08:24

## 2018-01-05 RX ADMIN — SIMETHICONE CHEW TAB 80 MG 80 MG: 80 TABLET ORAL at 11:14

## 2018-01-05 RX ADMIN — DOCUSATE SODIUM 100 MG: 100 CAPSULE ORAL at 04:51

## 2018-01-05 RX ADMIN — IBUPROFEN 600 MG: 600 TABLET, FILM COATED ORAL at 04:50

## 2018-01-05 RX ADMIN — IBUPROFEN 600 MG: 600 TABLET, FILM COATED ORAL at 11:14

## 2018-01-05 RX ADMIN — DOCUSATE SODIUM 100 MG: 100 CAPSULE ORAL at 17:18

## 2018-01-05 RX ADMIN — SIMETHICONE CHEW TAB 80 MG 80 MG: 80 TABLET ORAL at 17:18

## 2018-01-05 RX ADMIN — SIMETHICONE CHEW TAB 80 MG 80 MG: 80 TABLET ORAL at 04:50

## 2018-01-05 RX ADMIN — OXYCODONE HYDROCHLORIDE AND ACETAMINOPHEN 1 TABLET: 10; 325 TABLET ORAL at 11:25

## 2018-01-05 ASSESSMENT — PAIN SCALES - GENERAL
PAINLEVEL_OUTOF10: 6
PAINLEVEL_OUTOF10: 3
PAINLEVEL_OUTOF10: 3
PAINLEVEL_OUTOF10: 4
PAINLEVEL_OUTOF10: 2
PAINLEVEL_OUTOF10: 4
PAINLEVEL_OUTOF10: 0
PAINLEVEL_OUTOF10: 0
PAINLEVEL_OUTOF10: 7
PAINLEVEL_OUTOF10: 6
PAINLEVEL_OUTOF10: 6
PAINLEVEL_OUTOF10: 2

## 2018-01-05 ASSESSMENT — PATIENT HEALTH QUESTIONNAIRE - PHQ9
SUM OF ALL RESPONSES TO PHQ QUESTIONS 1-9: 0
SUM OF ALL RESPONSES TO PHQ9 QUESTIONS 1 AND 2: 0
1. LITTLE INTEREST OR PLEASURE IN DOING THINGS: NOT AT ALL
2. FEELING DOWN, DEPRESSED, IRRITABLE, OR HOPELESS: NOT AT ALL

## 2018-01-05 ASSESSMENT — COPD QUESTIONNAIRES
COPD SCREENING SCORE: 0
DURING THE PAST 4 WEEKS HOW MUCH DID YOU FEEL SHORT OF BREATH: NONE/LITTLE OF THE TIME
HAVE YOU SMOKED AT LEAST 100 CIGARETTES IN YOUR ENTIRE LIFE: NO/DON'T KNOW
DO YOU EVER COUGH UP ANY MUCUS OR PHLEGM?: NO/ONLY WITH OCCASIONAL COLDS OR INFECTIONS

## 2018-01-05 NOTE — PROGRESS NOTES
Attempted to get patient up to ambulate but patient became very nauseated and vomited. Got patient to side of bed but continued nausea and vomiting. Didn't attempt to ambulate further, assisted back to bed.vomiting clear fluid.

## 2018-01-05 NOTE — PROGRESS NOTES
Dr. Crook ordered percocet 5/325mg PO EVERY 4 HOURS PRN for Moderate Pain, pain scale 4-6 and percocet 10/325mg PO EVERY 4 HOURS PRN for Severe Pain, pain scale 7-10

## 2018-01-05 NOTE — PROGRESS NOTES
Follow up with MOB. Primary RN started MOB using the breastpump. Reviewed settings, MOB denies pain when using pump. She has HTH, and will plan to get insurance pump. Recommended to rent HG pump to help establish supply for infant in NICU. Rental info given. Ongoing lactation support offered.

## 2018-01-05 NOTE — PROGRESS NOTES
Pt assessment complete, wnl. Fundus firm with minimal discharge.  Pt ambulating and voiding without difficulty. Bonding well with infant in NICU, pumping. Plan of care discussed with patient for the day. Questions answered. Encouraged to call with needs, will monitor.

## 2018-01-05 NOTE — PROGRESS NOTES
2000 Received awake on bed with on going IVF of LR and Magnesium Sulfate infusing in different pump in progress, with julio catheter connected to urine bag draining to clear urine output, with sequential stocking bilaterally, Assessment done wound covered wit Mepilex clean dry and intact, Pt denies pain at this time, Encourage early mobilization, Needs attended.

## 2018-01-05 NOTE — PROGRESS NOTES
"Teaching on pump settings speed 80 (decrease once milk flows to 50-60), suction 40 (to comfort), x 10-15 minutes, every 2-3 hours or 8-10 pump sessions per day. Mother has [facility] and has paperwork to pick-up HG pump at [facility] (called [facility] and placed on hold for patient). \"Breastfeeding Essentials\" pamphlet provided with review. NICU breastfeeding pathway sheet given.   "

## 2018-01-05 NOTE — CARE PLAN
Problem: Potential for postpartum infection related to surgical incision, compromised uterine condition, urinary tract or respiratory compromise  Goal: Patient will be afebrile and free from signs and symptoms of infection  Outcome: PROGRESSING AS EXPECTED  Patient vitals stable.temp wnl. Patient has no fever or chills. Pads changed every 3-4 hours. Cbc shows no signs of infection.

## 2018-01-05 NOTE — DISCHARGE PLANNING
Medical Social Work    Infant: Still no name     SW met with MOB at bedside to complete assessment.      Plan:  SW to follow and assist as needed.     Discharge Planning Assessment Post Partum     Reason for Referral: NICU- 34 weeks 5 d  Address[place] [id number]  Type of Living Situation: Lives with FOB   Mom Diagnosis: Pregnancy  Baby Diagnosis: Respiratory distress of , prematurity  Primary Language: English     Father of the Baby: Gene Lemos  Involved in baby’s care? Yes  FOB'S : 1968   Prenatal Care: Yes  Mom's PCP: No PCP  PCP for new baby: Dr. Wiley     Support System: FOB   Source of Feeding: Pumping  Supplies for Infant: Has all needed supplies     Mom's Insurance: [employer]  Baby Covered on Insurance: Yes  Mother Employed/School: [employer]  Other children in the home/names & ages: Rosalba (2)  Mom's Mental status: A&Ox4     Resources Provided: None  Referrals Made: None

## 2018-01-05 NOTE — PROGRESS NOTES
Progress Note    Hilton Lemos   Post-op day 1  Chief Admitting Dx: Pregnancy   H/O:  section  Preeclampsia  Delivery Type:  for repeat at 34 weeks with severe PIH      Subjective:  Pt was on MagSo4 x 24 hours and now off. Pt without PIH sx. Pt is pumping for baby in ICN. Pt with good pain control.  Ambulating: yes  Tolerating oral feed: yes  Flatus: no   Voiding: yes  Dizziness: no  Vitals:    18 0100 18 0200 18 0300 18 0400   BP: 115/71 122/69 129/79 134/80   Pulse: 78 76 81 79   Resp:    Temp:    36.5 °C (97.7 °F)   TempSrc:       SpO2: 95% 96% 95% 93%   Weight:       Height:         UO: 3,175 cc x 12 hrs    Exam:  Gen: NAD  Abdomen: Abdomen soft, non-tender. BS normal. No masses,  No organomegaly  Incision: dry and intact, dressing in place  Fundus Tenderness: no  Below umbilicus: yes  Perineum: perineum intact  Extremities: 2+ edema  Lochia: mild    Labs:   Recent Results (from the past 24 hour(s))   URINALYSIS    Collection Time: 18  7:10 AM   Result Value Ref Range    Color Yellow     Character Cloudy (A)     Specific Gravity 1.030 <1.035    Ph 5.0 5.0 - 8.0    Glucose Negative Negative mg/dL    Ketones Trace (A) Negative mg/dL    Protein >=1000 (A) Negative mg/dL    Bilirubin Negative Negative    Urobilinogen, Urine 0.2 Negative    Nitrite Negative Negative    Leukocyte Esterase Negative Negative    Occult Blood Small (A) Negative    Micro Urine Req Microscopic    PROTEIN/CREAT RATIO URINE    Collection Time: 18  7:10 AM   Result Value Ref Range    Total Protein, Urine 673.8 (H) 0.0 - 15.0 mg/dL    Creatinine, Random Urine 149.20 mg/dL    Protein Creatinine Ratio 4516 (H) 10 - 107 mg/g   URINE MICROSCOPIC (W/UA)    Collection Time: 18  7:10 AM   Result Value Ref Range    WBC 0-2 /hpf    RBC 2-5 (A) /hpf    Bacteria Negative None /hpf    Epithelial Cells Many (A) /hpf    Epithelial Cells Renal Few /hpf    Trans Epithelial Cells  Few /hpf    Hyaline Cast 3-5 (A) /lpf   SERUM MAGNESIUM LEVELS EVERY 6 HRS UNTIL DESIRED RANGE (5-8 MG/DL) ACHIEVED    Collection Time: 01/04/18 12:32 PM   Result Value Ref Range    Magnesium 6.5 (HH) 1.5 - 2.5 mg/dL   CBC without differential    Collection Time: 01/05/18  5:53 AM   Result Value Ref Range    WBC 13.1 (H) 4.8 - 10.8 K/uL    RBC 4.03 (L) 4.20 - 5.40 M/uL    Hemoglobin 12.6 12.0 - 16.0 g/dL    Hematocrit 37.0 37.0 - 47.0 %    MCV 91.8 81.4 - 97.8 fL    MCH 31.3 27.0 - 33.0 pg    MCHC 34.1 33.6 - 35.0 g/dL    RDW 44.7 35.9 - 50.0 fL    Platelet Count 187 164 - 446 K/uL    MPV 11.6 9.0 - 12.9 fL       Assessment:  Continue Routine postpartum care      Plan:  Encourange Ambulation, SHASHANK Crook M.D.

## 2018-01-05 NOTE — CARE PLAN
Problem: Alteration in comfort related to surgical incision and/or after birth pains  Goal: Patient verbalizes acceptable pain level  Outcome: PROGRESSING AS EXPECTED  Patient states pain is tolerable with pain medication.  Will continue to reassess with hourly rounding and medicate accordingly to 0-10 pain scale.    Problem: Potential anxiety related to difficulty adapting to parental role  Goal: Patient will verbalize and demonstrate effective bonding and parenting behavior  Outcome: PROGRESSING AS EXPECTED  Patient is bonding well with baby in NICU, pumping, going to NICU to visit baby.

## 2018-01-05 NOTE — PROGRESS NOTES
Spoke with patient and confirmed understanding with patient that she could call nicu with any concerns and as soon as she felt ready to go see baby to let us know. Due to nausea earlier patient not anxious to get up now, but was confident her baby was in good hands and when she was feeling better she would let us know.

## 2018-01-05 NOTE — CARE PLAN
Problem: Altered physiologic condition related to postoperative  delivery  Goal: Patient physiologically stable as evidenced by normal lochia, palpable uterine involution and vital signs within normal limits  Outcome: PROGRESSING AS EXPECTED  Fundus firm, lochia light,blood pressure and other vitals stable.  Patient encouraged increase intake of fluids .

## 2018-01-06 VITALS
SYSTOLIC BLOOD PRESSURE: 159 MMHG | BODY MASS INDEX: 25.31 KG/M2 | TEMPERATURE: 97.9 F | RESPIRATION RATE: 20 BRPM | OXYGEN SATURATION: 96 % | DIASTOLIC BLOOD PRESSURE: 81 MMHG | WEIGHT: 167 LBS | HEIGHT: 68 IN | HEART RATE: 70 BPM

## 2018-01-06 PROCEDURE — A9270 NON-COVERED ITEM OR SERVICE: HCPCS | Performed by: OBSTETRICS & GYNECOLOGY

## 2018-01-06 PROCEDURE — 700102 HCHG RX REV CODE 250 W/ 637 OVERRIDE(OP): Performed by: OBSTETRICS & GYNECOLOGY

## 2018-01-06 RX ORDER — SODIUM CHLORIDE, SODIUM LACTATE, POTASSIUM CHLORIDE, CALCIUM CHLORIDE 600; 310; 30; 20 MG/100ML; MG/100ML; MG/100ML; MG/100ML
INJECTION, SOLUTION INTRAVENOUS CONTINUOUS
Status: DISCONTINUED | OUTPATIENT
Start: 2018-01-06 | End: 2018-01-06

## 2018-01-06 RX ORDER — OXYCODONE HYDROCHLORIDE AND ACETAMINOPHEN 5; 325 MG/1; MG/1
1-2 TABLET ORAL EVERY 4 HOURS PRN
Qty: 30 TAB | Refills: 0 | Status: SHIPPED | OUTPATIENT
Start: 2018-01-06 | End: 2018-01-13

## 2018-01-06 RX ORDER — IBUPROFEN 600 MG/1
600 TABLET ORAL EVERY 6 HOURS PRN
Qty: 30 TAB | Refills: 3 | Status: SHIPPED | OUTPATIENT
Start: 2018-01-06 | End: 2022-02-24

## 2018-01-06 RX ADMIN — IBUPROFEN 600 MG: 600 TABLET, FILM COATED ORAL at 09:36

## 2018-01-06 RX ADMIN — Medication 1 TABLET: at 07:35

## 2018-01-06 RX ADMIN — OXYCODONE HYDROCHLORIDE AND ACETAMINOPHEN 1 TABLET: 5; 325 TABLET ORAL at 07:35

## 2018-01-06 RX ADMIN — OXYCODONE HYDROCHLORIDE AND ACETAMINOPHEN 1 TABLET: 5; 325 TABLET ORAL at 03:30

## 2018-01-06 ASSESSMENT — PAIN SCALES - GENERAL
PAINLEVEL_OUTOF10: 2
PAINLEVEL_OUTOF10: 6
PAINLEVEL_OUTOF10: 6
PAINLEVEL_OUTOF10: 3
PAINLEVEL_OUTOF10: 4

## 2018-01-06 ASSESSMENT — LIFESTYLE VARIABLES: EVER_SMOKED: NEVER

## 2018-01-06 NOTE — DISCHARGE SUMMARY
DATE OF ADMISSION:  2018    DATE OF DISCHARGE:  2018    ADMITTING DIAGNOSES:   1.  Intrauterine pregnancy at 34-5/7 weeks.   2.  Prior  section.  3.  Prior twin gestation with demise of 1 twin at 16 weeks.  4.  Severe preeclampsia.     Please see previously dictated history and physical.    HOSPITAL COURSE:  The patient was admitted to labor and delivery on 2018   with complaints of visual symptoms and dyspnea.  She was found to have a   blood pressure of 211/96.  She was given labetalol and hydralazine and had   laboratory function tests consistent with HELLP syndrome.  She subsequently   underwent a repeat lower uterine segment transverse  section.  She was   noted to have fetus papyraceus, again the demise of a 16-week twin and a mild   placenta accreta.  She did not end up having  hysterectomy, however.    She underwent procedure with an estimated blood loss of 800 mL.  She had a   viable male infant, weight 2050 grams.  Apgars were 8 and 9.  Patient's   postpartum and postoperative course was significant for requiring magnesium   sulfate for 24 hours.  Once her magnesium sulfate was discontinued, she was   without symptoms, she is voiding well and pumping for the baby in the ICN and   was requesting to go home on postoperative day 2.  Her blood pressures have   ranged from 130s-150s/70s-90s.  She denies any symptoms of right upper   quadrant pain and shortness of breath.    PHYSICAL EXAMINATION:   VITAL SIGNS:  On discharge, temperature is 97.9, pulse 70, blood pressure   again systolic 135-159, diastolic 74-90.  GENERAL:  She is pleasant, in no apparent distress.   ABDOMEN:  Soft and nontender.  Her fundus is firm below the umbilicus.  Her   bowel sounds are positive and normoactive.  Her incision dressing is dry.  EXTREMITIES:  Show +1 pedal edema.  She has no cords or Homans sign.    Her final H and H was 12 and 37.  Her platelet count increased to 187,000 from   a  low of 94,000 on admission.  Her liver function tests were not repeated,   but her AST was 66 and ALT was 31 on admission.    IMPRESSION:  This is a 47-year-old  2, para 2, who is status post   repeat lower uterine segment transverse  section for severe   preeclampsia and thrombocytopenia consistent with HELLP syndrome.    PLAN:  1.  Patient will be discharged home with instructions to follow up with Dr. Perera in 2 weeks.  2.  She has been a blood pressure cuff and blood pressure parameters have been   discussed with the patient.  3.  Patient is given a prescription for Percocet and ibuprofen.  4.  Patient is instructed on no lifting over 10 pounds for 6 weeks, no driving   for 2 weeks, and pelvic rest for 6 weeks.  5.  She is encouraged to continue prenatal vitamins while breastfeeding.       ____________________________________     MD RENETTA DUPREE / VASILE    DD:  2018 08:33:20  DT:  2018 09:49:25    D#:  [id number]  Job#:  [id number]    cc: SKYLER ALONSO MD, WILNER CRUZ MD

## 2018-01-06 NOTE — DISCHARGE INSTRUCTIONS
"POSTPARTUM DISCHARGE INSTRUCTIONS FOR MOM    YOB: 1970   Age: 47 y.o.               Admit Date: 2018     Discharge Date: 2018  Attending Doctor:  Lisa Perera M.D.                  Allergies:  Patient has no known allergies.    Be sure to schedule a follow-up appointment with your primary care doctor or any specialists as instructed.     REASONS TO CALL YOUR OBSTETRICIAN:  1.   Persistent fever or shaking chills (Temperature higher than 100.4)  2.   Heavy bleeding (soaking more than 1 pad per hour); Passing clots  3.   Foul odor from vagina  4.   Mastitis (Breast infection; breast pain, chills, fever, redness)  5.   Urinary pain, burning or frequency  6.   Abdominal incision infection  7.   Severe depression longer than 24 hours    HAND WASHING  · Prior to handling the baby.  · Before breastfeeding or bottle feeding baby.  · After using the bathroom or changing the baby's diaper.    WOUND CARE  Ask your physician for additional care instructions.  In general:    ·  Incision:      · Keep clean and dry.    · Do NOT lift anything heavier than your baby for up to 6 weeks.    · There should not be any opening or pus.      VAGINAL CARE  · Nothing inside vagina for 6 weeks: no sexual intercourse, tampons or douching.  · Bleeding may continue for 2-4 weeks.  Amount may vary.    · Call your physician for heavy bleeding which means soaking more than 1 pad per hour    BIRTH CONTROL  · It is possible to become pregnant at any time after delivery and while breastfeeding.  · Plan to discuss a method of birth control with your physician at your follow up visit. visit.    DIET AND ELIMINATION  · Eating more fiber (bran cereal, fruits, and vegetables) and drinking plenty of fluids will help to avoid constipation.  · Urinary frequency after childbirth is normal.    POSTPARTUM BLUES  During the first few days after birth, you may experience a sense of the \"blues\" which may include impatience, irritability " "or even crying.  These feeling come and go quickly.  However, as many as 1 in 10 women experience emotional symptoms known as postpartum depression.    Postpartum depression:  May start as early as the second or third day after delivery or take several weeks or months to develop.  Symptoms of \"blues\" are present, but are more intense:  Crying spells; loss of appetite; feelings of hopelessness or loss of control; fear of touching the baby; over concern or no concern at all about the baby; little or no concern about your own appearance/caring for yourself; and/or inability to sleep or excessive sleeping.  Contact your physician if you are experiencing any of these symptoms.    Crisis Hotline:  · [place] Crisis Hotline:  8-187-RSDXDRT  Or 1-630.505.7562  · [place] Crisis Hotline:  1-661.538.2439  Or 131-439-3329    PREVENTING SHAKEN BABY:  If you are angry or stressed, PUT THE BABY IN THE CRIB, step away, take some deep breaths, and wait until you are calm to care for the baby.  DO NOT SHAKE THE BABY.  You are not alone, call a supporter for help.    · Crisis Call Center 24/7 crisis line 053-431-2088 or 1-915.616.7500  · You can also text them, text \"ANSWER\" to 889830    QUIT SMOKING/TOBACCO USE:  I understand the use of any tobacco products increases my chance of suffering from future heart disease and could cause other illnesses which may shorten my life. Quitting the use of tobacco products is the single most important thing I can do to improve my health. For further information on smoking / tobacco cessation call a Toll Free Quit Line at 1-308.324.5174 (*[employer] [place]) or 1-568.293.1844 (American Lung Association) or you can access the web based program at www.lungusa.org.    · [place] Tobacco Users Help Line:  (322) 828-8367       Toll Free: 1-331.651.4172  · Quit Tobacco Program [facility] (802)440-9835    DEPRESSION / SUICIDE RISK:  As you are discharged from this [facility]" facility, it is important to learn how to keep safe from harming yourself.    Recognize the warning signs:  · Abrupt changes in personality, positive or negative- including increase in energy   · Giving away possessions  · Change in eating patterns- significant weight changes-  positive or negative  · Change in sleeping patterns- unable to sleep or sleeping all the time   · Unwillingness or inability to communicate  · Depression  · Unusual sadness, discouragement and loneliness  · Talk of wanting to die  · Neglect of personal appearance   · Rebelliousness- reckless behavior  · Withdrawal from people/activities they love  · Confusion- inability to concentrate     If you or a loved one observes any of these behaviors or has concerns about self-harm, here's what you can do:  · Talk about it- your feelings and reasons for harming yourself  · Remove any means that you might use to hurt yourself (examples: pills, rope, extension cords, firearm)  · Get professional help from the community (Mental Health, Substance Abuse, psychological counseling)  · Do not be alone:Call your Safe Contact- someone whom you trust who will be there for you.  · Call your local CRISIS HOTLINE 698-0773 or 236-759-3582  · Call your local Children's Mobile Crisis Response Team [place] (470) 033-2548 or www.GaBoom  · Call the toll free National Suicide Prevention Hotlines   · National Suicide Prevention Lifeline 320-678-QWAL (3763)  · National Hope Line Network 800-SUICIDE (229-9477)    DISCHARGE SURVEY:  Thank you for choosing [facility].  We hope we provided you with very good care.  You may be receiving a survey in the mail.  Please fill it out.  Your opinion is valuable to us.    ADDITIONAL EDUCATIONAL MATERIALS GIVEN TO PATIENT:        My signature on this form indicates that:  1.  I have reviewed and understand the above information  2.  My questions regarding this information have been answered to my satisfaction.  3.  I  have formulated a plan with my discharge nurse to obtain my prescribed medication for home.

## 2018-01-06 NOTE — CARE PLAN
Problem: Altered physiologic condition related to postoperative  delivery  Goal: Patient physiologically stable as evidenced by normal lochia, palpable uterine involution and vital signs within normal limits  Outcome: PROGRESSING AS EXPECTED  Fundal massaged done with scant bleeding    Problem: Alteration in comfort related to surgical incision and/or after birth pains  Goal: Patient is able to ambulate, care for self and infant with acceptable pain level  Outcome: PROGRESSING AS EXPECTED  Pain medicine given as requested

## 2018-01-07 NOTE — PROGRESS NOTES
Lactation note:    HG pump is in room, and SHANAE has been pumping and getting increasing amounts of colostrum/milk.     SHANAE already has her HG pump for home use.   MOB is heading down to NICU to do some holding.    MOB has no other questions or concerns regarding breastfeeding. Encouraged to call for assistance when latching infant in NICU.

## 2018-04-17 ENCOUNTER — OFFICE VISIT (OUTPATIENT)
Dept: URGENT CARE | Facility: CLINIC | Age: 48
End: 2018-04-17
Payer: COMMERCIAL

## 2018-04-17 VITALS
WEIGHT: 140.65 LBS | TEMPERATURE: 98.6 F | HEIGHT: 68 IN | SYSTOLIC BLOOD PRESSURE: 124 MMHG | DIASTOLIC BLOOD PRESSURE: 70 MMHG | RESPIRATION RATE: 16 BRPM | BODY MASS INDEX: 21.32 KG/M2 | HEART RATE: 78 BPM | OXYGEN SATURATION: 96 %

## 2018-04-17 DIAGNOSIS — J06.9 UPPER RESPIRATORY TRACT INFECTION, UNSPECIFIED TYPE: ICD-10-CM

## 2018-04-17 DIAGNOSIS — R05.9 COUGH: ICD-10-CM

## 2018-04-17 PROCEDURE — 99204 OFFICE O/P NEW MOD 45 MIN: CPT | Performed by: PHYSICIAN ASSISTANT

## 2018-04-17 RX ORDER — PROMETHAZINE HYDROCHLORIDE AND CODEINE PHOSPHATE 6.25; 1 MG/5ML; MG/5ML
5 SYRUP ORAL EVERY 12 HOURS PRN
Qty: 75 ML | Refills: 0 | Status: SHIPPED | OUTPATIENT
Start: 2018-04-17 | End: 2018-05-01

## 2018-04-17 ASSESSMENT — ENCOUNTER SYMPTOMS
EYE DISCHARGE: 0
VOMITING: 0
SPUTUM PRODUCTION: 0
ABDOMINAL PAIN: 0
SHORTNESS OF BREATH: 0
FEVER: 0
EYE REDNESS: 0
DIARRHEA: 0
WHEEZING: 0
NAUSEA: 0
MYALGIAS: 0
SORE THROAT: 0
CHILLS: 0
COUGH: 1

## 2018-04-17 NOTE — PROGRESS NOTES
Subjective:     Hilton Lemos is a 47 y.o. female who presents for Cough (3 days)       Cough   This is a new problem. The current episode started in the past 7 days (3d). The cough is productive of sputum. Pertinent negatives include no chest pain, chills, ear pain, eye redness, fever, myalgias, rash, sore throat, shortness of breath or wheezing.   notes cough x last 3-4d, dry cough, denies wheeze, c/o some tightness denies wheeze, denies PMH of asthma, denies fever/chills, denies ST / ear pain, denies nasuea/vomiting, denies congestion to sinus c/o very mild chest congestion, denies PMH of bronchitis/pneumonia, does have seasonal allerg - has had sinusitis this year. Tried zpack/medrol dosepack called in by father who is MD. No dramatic change, still on medrol, finishes zpack today/tomorrow.     Past Medical History:   Diagnosis Date   • Pregnant      Past Surgical History:   Procedure Laterality Date   • REPEAT C SECTION  1/4/2018    Procedure: REPEAT C SECTION;  Surgeon: Primitivo Oconnor M.D.;  Location: LABOR AND DELIVERY;  Service: Labor and Delivery   • GYN SURGERY  2015    prior c section     Social History     Social History   • Marital status: Single     Spouse name: N/A   • Number of children: N/A   • Years of education: N/A     Occupational History   • Not on file.     Social History Main Topics   • Smoking status: Never Smoker   • Smokeless tobacco: Never Used   • Alcohol use No   • Drug use: No   • Sexual activity: Not on file     Other Topics Concern   • Not on file     Social History Narrative   • No narrative on file    History reviewed. No pertinent family history. Review of Systems   Constitutional: Negative for chills and fever.   HENT: Positive for congestion. Negative for ear pain and sore throat.    Eyes: Negative for discharge and redness.   Respiratory: Positive for cough. Negative for sputum production, shortness of breath and wheezing.    Cardiovascular: Negative for chest  "pain and leg swelling.   Gastrointestinal: Negative for abdominal pain, diarrhea, nausea and vomiting.   Musculoskeletal: Negative for myalgias.   Skin: Negative for rash.   No Known Allergies   I have worn a mask for the entire encounter with this patient.    Objective:   /70   Pulse 78   Temp 37 °C (98.6 °F)   Resp 16   Ht 1.727 m (5' 8\")   Wt 63.8 kg (140 lb 10.5 oz)   SpO2 96%   BMI 21.39 kg/m²   Physical Exam   Constitutional: She is oriented to person, place, and time. She appears well-developed and well-nourished. No distress.   HENT:   Head: Normocephalic and atraumatic.   Right Ear: Tympanic membrane, external ear and ear canal normal.   Left Ear: Tympanic membrane, external ear and ear canal normal.   Nose: Nose normal.   Mouth/Throat: Uvula is midline and mucous membranes are normal. Posterior oropharyngeal erythema ( mild PND) present. No oropharyngeal exudate, posterior oropharyngeal edema or tonsillar abscesses.   Eyes: Conjunctivae and lids are normal. Right eye exhibits no discharge. Left eye exhibits no discharge. No scleral icterus.   Neck: Neck supple.   Pulmonary/Chest: Effort normal. No respiratory distress. She has no decreased breath sounds. She has no wheezes. She has no rhonchi. She has no rales.   Musculoskeletal: Normal range of motion.   Lymphadenopathy:     She has cervical adenopathy ( mild bilat).   Neurological: She is alert and oriented to person, place, and time. She is not disoriented.   Skin: Skin is warm and dry. She is not diaphoretic. No erythema. No pallor.   Psychiatric: Her speech is normal and behavior is normal.   Nursing note and vitals reviewed.        Assessment/Plan:   Assessment    1. Cough  Supportive care is reviewed with patient/caregiver - recommend to push PO fluids and electrolytes, Nsaids/tylenol, netti pot/saline irrig, humidifier in home, flonase, ponaris, antihistamine, discuss w/ current tx of bronchospasm w/ medrol and finishing zpack - likely " resolving viral URI - Cautioned regarding potential for sedation with medication.  Return to clinic with lack of resolution or progression of symptoms.    - promethazine-codeine (PHENERGAN-CODEINE) 6.25-10 MG/5ML Syrup; Take 5 mL by mouth every 12 hours as needed for up to 14 days.  Dispense: 75 mL; Refill: 0    2. Upper respiratory tract infection, unspecified type      Differential diagnosis, natural history, supportive care, and indications for immediate follow-up discussed.

## 2018-04-18 ENCOUNTER — TELEPHONE (OUTPATIENT)
Dept: URGENT CARE | Facility: CLINIC | Age: 48
End: 2018-04-18

## 2018-04-19 ENCOUNTER — TELEPHONE (OUTPATIENT)
Dept: URGENT CARE | Facility: CLINIC | Age: 48
End: 2018-04-19

## 2018-04-19 DIAGNOSIS — J98.01 BRONCHOSPASM: ICD-10-CM

## 2018-04-19 RX ORDER — ALBUTEROL SULFATE 90 UG/1
2 AEROSOL, METERED RESPIRATORY (INHALATION) EVERY 6 HOURS PRN
Qty: 8.5 G | Refills: 2 | Status: SHIPPED | OUTPATIENT
Start: 2018-04-19 | End: 2022-06-28 | Stop reason: SDUPTHER

## 2018-11-01 ENCOUNTER — APPOINTMENT (OUTPATIENT)
Dept: RADIOLOGY | Facility: MEDICAL CENTER | Age: 48
End: 2018-11-01
Attending: OBSTETRICS & GYNECOLOGY
Payer: COMMERCIAL

## 2019-04-18 ENCOUNTER — HOSPITAL ENCOUNTER (OUTPATIENT)
Dept: RADIOLOGY | Facility: MEDICAL CENTER | Age: 49
End: 2019-04-18

## 2019-04-25 ENCOUNTER — HOSPITAL ENCOUNTER (OUTPATIENT)
Dept: RADIOLOGY | Facility: MEDICAL CENTER | Age: 49
End: 2019-04-25
Attending: OBSTETRICS & GYNECOLOGY
Payer: COMMERCIAL

## 2019-04-25 DIAGNOSIS — Z12.31 VISIT FOR SCREENING MAMMOGRAM: ICD-10-CM

## 2019-04-25 PROCEDURE — 77063 BREAST TOMOSYNTHESIS BI: CPT

## 2022-02-24 ENCOUNTER — OFFICE VISIT (OUTPATIENT)
Dept: MEDICAL GROUP | Facility: CLINIC | Age: 52
End: 2022-02-24
Payer: MEDICAID

## 2022-02-24 VITALS
BODY MASS INDEX: 26.06 KG/M2 | OXYGEN SATURATION: 100 % | WEIGHT: 166 LBS | HEIGHT: 67 IN | SYSTOLIC BLOOD PRESSURE: 124 MMHG | HEART RATE: 78 BPM | TEMPERATURE: 97.6 F | DIASTOLIC BLOOD PRESSURE: 78 MMHG

## 2022-02-24 DIAGNOSIS — E78.49 OTHER HYPERLIPIDEMIA: Primary | ICD-10-CM

## 2022-02-24 DIAGNOSIS — R53.83 FATIGUE, UNSPECIFIED TYPE: ICD-10-CM

## 2022-02-24 DIAGNOSIS — Z12.31 SCREENING MAMMOGRAM, ENCOUNTER FOR: ICD-10-CM

## 2022-02-24 DIAGNOSIS — Z12.11 SCREEN FOR COLON CANCER: ICD-10-CM

## 2022-02-24 PROCEDURE — 99214 OFFICE O/P EST MOD 30 MIN: CPT | Performed by: FAMILY MEDICINE

## 2022-02-24 RX ORDER — ATORVASTATIN CALCIUM 40 MG/1
40 TABLET, FILM COATED ORAL DAILY
Qty: 90 TABLET | Refills: 0 | Status: SHIPPED | OUTPATIENT
Start: 2022-02-24 | End: 2022-05-17 | Stop reason: SDUPTHER

## 2022-02-24 RX ORDER — ATORVASTATIN CALCIUM 40 MG/1
TABLET, FILM COATED ORAL
COMMUNITY
Start: 2021-12-06 | End: 2022-02-24 | Stop reason: SDUPTHER

## 2022-02-24 ASSESSMENT — PATIENT HEALTH QUESTIONNAIRE - PHQ9: CLINICAL INTERPRETATION OF PHQ2 SCORE: 0

## 2022-02-24 NOTE — PATIENT INSTRUCTIONS
Patient will call ob/gyn and ask when was last pap? Was HPV testing done?  If both negative, then pap due again in 5 years.      Mammogram: ordered    Colonoscopy: ordered    Covid vaccine x 2.      Shingrix.  Shingles vaccine is good for those older than age 50.  Shingrix is the name brand.  It is 2 vaccines spaced 2 months or more apart.  You do not need a prescription from me.  Call around to the pharmacies, find a pharmacy that has it in stock.  If none do, ask for them to put you on a wait list and they should call when they get a box of it in.

## 2022-02-24 NOTE — ASSESSMENT & PLAN NOTE
Chronic condition. Uncontrolled.  Atorvastatin 40mg.  She takes this every other day.  Family history of hyperlipidemia.  No MI or CVA.

## 2022-02-24 NOTE — PROGRESS NOTES
"  HPI:  Patient is a 51 y.o. female. This pleasant patient is here today to establish care.  She used to take levothyroxine while trying to conceive.  Otherwise, she denies hypothyroidism.     Problem   Other Hyperlipidemia    Chronic condition.                                                                                                                                    Patient Active Problem List    Diagnosis Date Noted   • Other hyperlipidemia 02/24/2022       Current Outpatient Medications   Medication Sig Dispense Refill   • atorvastatin (LIPITOR) 40 MG Tab Take 1 Tablet by mouth every day. 1 po daily 90 Tablet 0   • albuterol 108 (90 Base) MCG/ACT Aero Soln inhalation aerosol Inhale 2 Puffs by mouth every 6 hours as needed for Shortness of Breath. 8.5 g 2     No current facility-administered medications for this visit.         Allergies as of 02/24/2022   • (No Known Allergies)          /78 (BP Location: Right arm)   Pulse 78   Temp 36.4 °C (97.6 °F)   Ht 1.702 m (5' 7\")   Wt 75.3 kg (166 lb)   SpO2 100%   BMI 26.00 kg/m²     Gen: no fevers/chills, no changes in weight  Eyes: no changes in vision  ENT: no sore throat, no hearing loss, no bloody nose  Pulm: no sob, no cough  CV: no chest pain, no palpitations  GI: no nausea/vomiting, no diarrhea  : no dysuria or flank pain  MSk: no myalgias  Skin: no rash  Psych: no change in mood   Neuro: no headaches, no numbness/tingling  Heme/Lymph: no easy bruising or bleeding    Physical Exam:  Gen:         Alert and oriented, No apparent distress.  Neck:        No Lymphadenopathy or Bruits.  Lungs:     Clear to auscultation bilaterally  CV:           Regular rate and rhythm. No murmurs, rubs or gallops.    Abdomen: soft, nontender, nondistended.    Skin:      no rash or exudate             Ext:          No clubbing, cyanosis, edema.      Assessment and Plan    51 y.o. female with the following-  Problem List Items Addressed This Visit     Other " hyperlipidemia     Chronic condition. Uncontrolled.  Atorvastatin 40mg.  She takes this every other day.  Family history of hyperlipidemia.  No MI or CVA.         Relevant Medications    atorvastatin (LIPITOR) 40 MG Tab    Other Relevant Orders    Lipid Profile    Comp Metabolic Panel      Other Visit Diagnoses     Fatigue, unspecified type        Relevant Orders    CBC WITH DIFFERENTIAL    TSH WITH REFLEX TO FT4    Screen for colon cancer        Relevant Orders    Referral to GI for Colonoscopy    Screening mammogram, encounter for        Relevant Orders    MA-SCREENING MAMMO BILAT W/TOMOSYNTHESIS W/CAD        Return in about 3 months (around 5/24/2022).

## 2022-05-17 ENCOUNTER — PATIENT MESSAGE (OUTPATIENT)
Dept: MEDICAL GROUP | Facility: CLINIC | Age: 52
End: 2022-05-17
Payer: MEDICAID

## 2022-05-17 RX ORDER — ATORVASTATIN CALCIUM 40 MG/1
40 TABLET, FILM COATED ORAL DAILY
Qty: 90 TABLET | Refills: 0 | Status: SHIPPED | OUTPATIENT
Start: 2022-05-17 | End: 2022-07-18 | Stop reason: SDUPTHER

## 2022-06-28 ENCOUNTER — OFFICE VISIT (OUTPATIENT)
Dept: MEDICAL GROUP | Facility: CLINIC | Age: 52
End: 2022-06-28
Payer: MEDICAID

## 2022-06-28 VITALS
SYSTOLIC BLOOD PRESSURE: 128 MMHG | WEIGHT: 167 LBS | HEART RATE: 74 BPM | BODY MASS INDEX: 25.31 KG/M2 | DIASTOLIC BLOOD PRESSURE: 80 MMHG | HEIGHT: 68 IN | TEMPERATURE: 96.8 F | OXYGEN SATURATION: 96 %

## 2022-06-28 DIAGNOSIS — J98.01 BRONCHOSPASM: ICD-10-CM

## 2022-06-28 DIAGNOSIS — R05.3 CHRONIC COUGH: ICD-10-CM

## 2022-06-28 PROCEDURE — 99213 OFFICE O/P EST LOW 20 MIN: CPT | Mod: GE | Performed by: STUDENT IN AN ORGANIZED HEALTH CARE EDUCATION/TRAINING PROGRAM

## 2022-06-28 RX ORDER — ALBUTEROL SULFATE 90 UG/1
2 AEROSOL, METERED RESPIRATORY (INHALATION) EVERY 6 HOURS PRN
Qty: 18 G | Refills: 3 | Status: SHIPPED | OUTPATIENT
Start: 2022-06-28 | End: 2024-02-16 | Stop reason: SDUPTHER

## 2022-06-28 NOTE — PROGRESS NOTES
"Subjective:     CC:     HPI:   Hilton presents today with:     Problem   Chronic Cough    Chronic cough, frequency of multiple episodes daily. Worse following URI. Typically lasts 3 weeks after upper resp illness. No particular time of day. Non smoker. Worsened when trying to take a deep breath. No real seasonality. Patient has noticed symptoms for the last 2 years. No change of progression or improvement.          Current Outpatient Medications Ordered in Epic   Medication Sig Dispense Refill   • albuterol 108 (90 Base) MCG/ACT Aero Soln inhalation aerosol Inhale 2 Puffs every 6 hours as needed for Shortness of Breath. 18 g 3   • atorvastatin (LIPITOR) 40 MG Tab Take 1 Tablet by mouth every day. 1 po daily 90 Tablet 0     No current Epic-ordered facility-administered medications on file.       ROS:  Gen: no fevers/chills, no changes in weight  Eyes: no changes in vision  ENT: no sore throat, no hearing loss, no bloody nose  Pulm: no sob, no cough  CV: no chest pain, no palpitations  GI: no nausea/vomiting, no diarrhea  : no dysuria  MSk: no myalgias  Skin: no rash  Neuro: no headaches, no numbness/tingling  Heme/Lymph: no easy bruising      Objective:     Exam:  /80 (BP Location: Left arm)   Pulse 74   Temp 36 °C (96.8 °F)   Ht 1.727 m (5' 8\")   Wt 75.8 kg (167 lb)   SpO2 96%   BMI 25.39 kg/m²  Body mass index is 25.39 kg/m².    Gen: Alert and oriented, No apparent distress.  Neck: Neck is supple without lymphadenopathy.   HEENT: mild erythema and possible cobblestoning in posterior oropharynx. Large tonsils.   Lungs: Normal effort, CTA bilaterally, no wheezes, rhonchi, or rales. Good airflow  CV: Regular rate and rhythm. No murmurs, rubs, or gallops.  Ext: No clubbing, cyanosis, edema.    Labs: none to review    Assessment & Plan:     51 y.o. female with the following -     Problem List Items Addressed This Visit     Chronic cough     Suspect cough variant asthma, vs post nasal drip 2/2 seasonal " allergies, vs GERD. Low suspicion for GERD based on history.     Albuterol inhaler, directed to use for coughing fits.   PFT's referral placed    If immediate relief experienced continue to take medication. If using more than 2-3 times per week, please return to clinic for follow up as soon as you are able.     Continue daily allergy medication with attention to daily compliance             Other Visit Diagnoses     Bronchospasm        Relevant Medications    albuterol 108 (90 Base) MCG/ACT Aero Soln inhalation aerosol    Other Relevant Orders    Referral to Pulmonary and Sleep Medicine

## 2022-06-28 NOTE — ASSESSMENT & PLAN NOTE
Suspect cough variant asthma, vs post nasal drip 2/2 seasonal allergies, vs GERD. Low suspicion for GERD based on history.     Albuterol inhaler, directed to use for coughing fits.   PFT's referral placed    If immediate relief experienced continue to take medication. If using more than 2-3 times per week, please return to clinic for follow up as soon as you are able.     Continue daily allergy medication with attention to daily compliance

## 2022-07-18 RX ORDER — ATORVASTATIN CALCIUM 40 MG/1
40 TABLET, FILM COATED ORAL DAILY
Qty: 90 TABLET | Refills: 3 | Status: SHIPPED | OUTPATIENT
Start: 2022-07-18 | End: 2023-12-28 | Stop reason: SDUPTHER

## 2022-08-23 ENCOUNTER — HOSPITAL ENCOUNTER (OUTPATIENT)
Facility: MEDICAL CENTER | Age: 52
End: 2022-08-23
Attending: STUDENT IN AN ORGANIZED HEALTH CARE EDUCATION/TRAINING PROGRAM
Payer: MEDICAID

## 2022-08-23 ENCOUNTER — OFFICE VISIT (OUTPATIENT)
Dept: MEDICAL GROUP | Facility: CLINIC | Age: 52
End: 2022-08-23
Payer: MEDICAID

## 2022-08-23 VITALS
HEIGHT: 68 IN | TEMPERATURE: 98 F | SYSTOLIC BLOOD PRESSURE: 106 MMHG | DIASTOLIC BLOOD PRESSURE: 74 MMHG | OXYGEN SATURATION: 95 % | BODY MASS INDEX: 25.01 KG/M2 | WEIGHT: 165 LBS | HEART RATE: 74 BPM

## 2022-08-23 DIAGNOSIS — Z01.419 ENCOUNTER FOR CERVICAL PAP SMEAR WITH PELVIC EXAM: ICD-10-CM

## 2022-08-23 DIAGNOSIS — Z00.00 ENCOUNTER FOR PREVENTIVE HEALTH EXAMINATION: ICD-10-CM

## 2022-08-23 PROCEDURE — 88175 CYTOPATH C/V AUTO FLUID REDO: CPT

## 2022-08-23 PROCEDURE — 87624 HPV HI-RISK TYP POOLED RSLT: CPT

## 2022-08-23 PROCEDURE — 99213 OFFICE O/P EST LOW 20 MIN: CPT | Mod: GE | Performed by: STUDENT IN AN ORGANIZED HEALTH CARE EDUCATION/TRAINING PROGRAM

## 2022-08-23 NOTE — PROGRESS NOTES
Subjective:     CC:   Chief Complaint   Patient presents with    Annual Exam     OBGYN  visit / pap smear       HPI:   Hilton Lemos is a 51 y.o. female who presents for annual exam    Patient has GYN provider: in the past  Last Pap Smear: 4.5 years ago   H/O Abnormal Pap: Yes, very remote, more than 10 years ago. All normal since  Last Mammogram: this year  Last Colorectal Cancer Screening: done  Received HPV series: Aged out    Exercise: moderate regular exercise, aerobic < 3 days a week     No LMP recorded.  She has not utilized hormone replacement therapy.  Denies hot flashes, night sweats, sleep disruption, and mood changes  No significant bloating/fluid retention, pelvic pain, or dyspareunia. No abnormal vaginal discharge.   No breast tenderness, mass, nipple discharge or changes in size or contour.    OB History    Para Term  AB Living   2 2 0 0 0 0   SAB IAB Ectopic Molar Multiple Live Births   0 0 0 0 0 0      She  reports being sexually active and has had partner(s) who are male. She reports using the following method of birth control/protection: Post-Menopausal.    She  has a past medical history of Hyperlipidemia and Pregnant.  She  has a past surgical history that includes gyn surgery (); repeat c section (2018); and primary c section.    Family History   Problem Relation Age of Onset    Hyperlipidemia Mother     Dementia Mother     Hyperlipidemia Father      Social History     Tobacco Use    Smoking status: Never    Smokeless tobacco: Never   Vaping Use    Vaping Use: Never used   Substance Use Topics    Alcohol use: No    Drug use: No       Patient Active Problem List    Diagnosis Date Noted    Chronic cough 2022    Other hyperlipidemia 2022     Current Outpatient Medications   Medication Sig Dispense Refill    atorvastatin (LIPITOR) 40 MG Tab Take 1 Tablet by mouth every day. 1 po daily 90 Tablet 3    albuterol 108 (90 Base) MCG/ACT Aero Soln inhalation  "aerosol Inhale 2 Puffs every 6 hours as needed for Shortness of Breath. 18 g 3     No current facility-administered medications for this visit.     No Known Allergies    Review of Systems   Constitutional: Negative for fever, chills and malaise/fatigue.   HENT: Negative for congestion.    Eyes: Negative for pain.   Respiratory: Negative for cough and shortness of breath.    Cardiovascular: Negative for chest pain and leg swelling.   Gastrointestinal: Negative for nausea, vomiting, abdominal pain and diarrhea.   Genitourinary: Negative for dysuria and hematuria.   Skin: Negative for rash.   Neurological: Negative for dizziness, focal weakness and headaches.   Endo/Heme/Allergies: Does not bruise/bleed easily.   Psychiatric/Behavioral: Negative for depression.  The patient is not nervous/anxious.      Objective:   /74 (BP Location: Right arm, Patient Position: Sitting, BP Cuff Size: Adult)   Pulse 74   Temp 36.7 °C (98 °F)   Ht 1.727 m (5' 8\")   Wt 74.8 kg (165 lb)   SpO2 95%   BMI 25.09 kg/m²     Wt Readings from Last 4 Encounters:   08/23/22 74.8 kg (165 lb)   08/22/22 74.8 kg (165 lb)   06/28/22 75.8 kg (167 lb)   02/24/22 75.3 kg (166 lb)       A chaperone was offered to the patient during today's exam. Chaperone name: Shawnee Schwartz was present.    Physical Exam:  Constitutional: Well-developed and well-nourished. Not diaphoretic. No distress.   Skin: Skin is warm and dry. No rash noted.  Head: Atraumatic without lesions.  Eyes: Conjunctivae and extraocular motions are normal. Pupils are equal, round, and reactive to light. No scleral icterus.   Ears:  External ears unremarkable.   Respiratory: Effort normal.   Abdomen: Soft, non tender, and without distention. Active bowel sounds in all four quadrants. No rebound, guarding, masses or HSM.  : Perineum and external genitalia normal without rash. Vagina with normal and physiologic discharge. Cervix without visible lesions or discharge. "   Extremities: No cyanosis, clubbing, erythema, nor edema. Distal pulses intact and symmetric.   Musculoskeletal: All major joints AROM full in all directions without pain.  Neurological: Alert and oriented x 3. Grossly non-focal. Strength and sensation grossly intact.   Psychiatric:  Behavior, mood, and affect are appropriate.    Assessment and Plan:     There are no diagnoses linked to this encounter.    Health maintenance:  Up to date   Labs per orders  Immunizations per orders  Patient counseled about skin care, diet, supplements, and exercise.  Discussed  mammography screening, use and side effects of HRT, menopause

## 2022-08-25 LAB
AMBIGUOUS DTTM AMBI4: NORMAL
CYTOLOGY REG CYTOL: NORMAL
HPV HR 12 DNA CVX QL NAA+PROBE: NEGATIVE
HPV16 DNA SPEC QL NAA+PROBE: NEGATIVE
HPV18 DNA SPEC QL NAA+PROBE: NEGATIVE
SPECIMEN SOURCE: NORMAL

## 2022-12-11 ENCOUNTER — PATIENT MESSAGE (OUTPATIENT)
Dept: MEDICAL GROUP | Facility: CLINIC | Age: 52
End: 2022-12-11
Payer: MEDICAID

## 2023-04-12 ENCOUNTER — PATIENT MESSAGE (OUTPATIENT)
Dept: MEDICAL GROUP | Facility: CLINIC | Age: 53
End: 2023-04-12
Payer: MEDICAID

## 2023-04-12 DIAGNOSIS — R92.30 DENSE BREAST: ICD-10-CM

## 2023-04-12 DIAGNOSIS — R92.2 DENSE BREAST: ICD-10-CM

## 2023-05-10 NOTE — PROGRESS NOTES
2130 pt doing well up to bathroom and showered, Assessment done wound covered with Mepilex clean and dry, complained of mild to moderate abdominal pain medicated her as per doctor orders, Encourage more ambulation Needs attended.   Fall with Harm Risk

## 2023-05-24 DIAGNOSIS — Z91.89 AT HIGH RISK FOR BREAST CANCER: ICD-10-CM

## 2023-12-18 ENCOUNTER — OFFICE VISIT (OUTPATIENT)
Dept: MEDICAL GROUP | Facility: CLINIC | Age: 53
End: 2023-12-18
Payer: MEDICAID

## 2023-12-18 VITALS
HEIGHT: 68 IN | BODY MASS INDEX: 25.01 KG/M2 | SYSTOLIC BLOOD PRESSURE: 132 MMHG | DIASTOLIC BLOOD PRESSURE: 78 MMHG | OXYGEN SATURATION: 95 % | RESPIRATION RATE: 16 BRPM | WEIGHT: 165 LBS | HEART RATE: 67 BPM | TEMPERATURE: 97.6 F

## 2023-12-18 DIAGNOSIS — Z00.00 ENCOUNTER FOR PREVENTIVE CARE: ICD-10-CM

## 2023-12-18 DIAGNOSIS — E78.49 OTHER HYPERLIPIDEMIA: ICD-10-CM

## 2023-12-18 DIAGNOSIS — N95.1 VASOMOTOR SYMPTOMS DUE TO MENOPAUSE: ICD-10-CM

## 2023-12-18 PROCEDURE — 3075F SYST BP GE 130 - 139MM HG: CPT

## 2023-12-18 PROCEDURE — 99396 PREV VISIT EST AGE 40-64: CPT | Mod: GE

## 2023-12-18 PROCEDURE — 3078F DIAST BP <80 MM HG: CPT

## 2023-12-18 RX ORDER — PROGESTERONE 100 MG/1
100 CAPSULE ORAL DAILY
Qty: 30 CAPSULE | Refills: 0 | Status: SHIPPED | OUTPATIENT
Start: 2023-12-18 | End: 2024-01-17

## 2023-12-18 RX ORDER — BUDESONIDE AND FORMOTEROL FUMARATE 160; 4.5 UG/1; UG/1
2 AEROSOL, METERED RESPIRATORY (INHALATION) 2 TIMES DAILY
COMMUNITY
Start: 2023-12-08

## 2023-12-18 RX ORDER — ESTRADIOL 0.04 MG/D
1 PATCH TRANSDERMAL
Qty: 4 PATCH | Refills: 3 | Status: SHIPPED | OUTPATIENT
Start: 2023-12-18

## 2023-12-18 ASSESSMENT — PATIENT HEALTH QUESTIONNAIRE - PHQ9: CLINICAL INTERPRETATION OF PHQ2 SCORE: 0

## 2023-12-18 NOTE — PROGRESS NOTES
SUBJECTIVE:     CC: annual check and referral    HPI:   Hilton presents today for annual check up and requesting referral for breast MRI per recommendations from her mammogram in May.     Hot flashes (vasomotor symptoms) in menopausal  Patient states she has not had menstrual period, nor any form of bleeding or spotting in at least a year. She complains of hot flashes. Patient states she is interested in trying estrogen patch therapy. She denies history of smoking, migraine, or blood cloting disorder.     Hyperlipidemia  Patient states she has a history high cholesterol, and was started on Atorvastatin a couple years ago.    Other care gaps  - Colonoscopy: patient states she had a normal colonoscopy was done in 2022. Will request records from [facility].  - Vaccines: patient states she is waiting till after the holidays for vaccines including the influenza  and covid vaccines.      Past Medical History:  Past Medical History:   Diagnosis Date    Hyperlipidemia     Pregnant        Patient Active Problem List   Diagnosis    Other hyperlipidemia    Chronic cough    Vasomotor symptoms due to menopause       Surgical History:  Past Surgical History:   Procedure Laterality Date    REPEAT C SECTION  1/4/2018    Procedure: REPEAT C SECTION;  Surgeon: Primitivo Oconnor M.D.;  Location: LABOR AND DELIVERY;  Service: Labor and Delivery    GYN SURGERY  2015    prior c section    PRIMARY C SECTION         Family History:  Family History   Problem Relation Age of Onset    Hyperlipidemia Mother     Dementia Mother     Hyperlipidemia Father        Social History:  Social History     Tobacco Use    Smoking status: Never    Smokeless tobacco: Never   Vaping Use    Vaping Use: Never used   Substance Use Topics    Alcohol use: No    Drug use: No       Medications:  Current Outpatient Medications on File Prior to Visit   Medication Sig Dispense Refill    BREYNA 160-4.5 MCG/ACT Aerosol Inhale 2 Puffs 2 times a day.      atorvastatin  "(LIPITOR) 40 MG Tab Take 1 Tablet by mouth every day. 1 po daily 90 Tablet 3    albuterol 108 (90 Base) MCG/ACT Aero Soln inhalation aerosol Inhale 2 Puffs every 6 hours as needed for Shortness of Breath. 18 g 3     No current facility-administered medications on file prior to visit.       No Known Allergies      ROS:   Gen: no fevers/chills, no changes in weight  Eyes: no changes in vision  ENT: no changes in hearing  Pulm: no sob, no cough  CV: no chest pain, no palpitations  GI: no nausea/vomiting, no diarrhea  MSk: no myalgias  Skin: no rash  Neuro: no headaches, no numbness/tingling      OBJECTIVE:     Exam:  /78 (BP Location: Left arm, Patient Position: Sitting, BP Cuff Size: Adult)   Pulse 67   Temp 36.4 °C (97.6 °F) (Temporal)   Resp 16   Ht 1.727 m (5' 8\")   Wt 74.8 kg (165 lb)   SpO2 95%   BMI 25.09 kg/m²  Body mass index is 25.09 kg/m².    Gen: Alert and oriented, No apparent distress.  Head:  NCAT, EOMI, sclera clear without discharge  Neck: Neck is supple without lymphadenopathy.  Lungs: Normal effort, CTA bilaterally, no wheezes, rhonchi, or rales  CV: Regular rate and rhythm. No murmurs, rubs, or gallops.  Abd:   Non-distended, soft  Ext: No clubbing, cyanosis, edema.  MSK: Unassisted gait  Psych: normal mood and affect      ASSESSMENT & PLAN:     53 y.o. female with the following -    Problem List Items Addressed This Visit       Encounter for preventive care        Relevant Orders    HIV AG/AB COMBO ASSAY SCREENING    HEP C VIRUS ANTIBODY    Breast MRI following mammogram was placed in May. Patient was provided a copy of the order.      Vasomotor symptoms due to menopause  Patient complains of hot flashes for the past one year. She has been menopausal for at least a year. Non-hormonal options were also discussed. Patient declined at this time. She is interested in estrogen hormonal therapy. She denies history of smoking, migraine, or blood cloting disorder.    - Benefits and risks of " HRT were discussed at length.     Relevant Medications    progesterone (PROMETRIUM) 100 MG Cap    estradiol (CLIMARA) 0.0375 MG/24HR patch      Other hyperlipidemia  Patient states she has a history high cholesterol, and was started on Atorvastatin a couple years ago.  - Continue Atorvastatin 40mg, daily.    Relevant Orders    Lipid Profile       Luis Lozada, PGY-2  [facility]

## 2023-12-19 ENCOUNTER — OFFICE VISIT (OUTPATIENT)
Dept: MEDICAL GROUP | Facility: CLINIC | Age: 53
End: 2023-12-19
Payer: MEDICAID

## 2023-12-19 DIAGNOSIS — F34.1 DYSTHYMIA: ICD-10-CM

## 2023-12-19 PROCEDURE — 90834 PSYTX W PT 45 MINUTES: CPT | Performed by: PSYCHOLOGIST

## 2023-12-19 NOTE — PROGRESS NOTES
" [facility]  Psychotherapy Summary Note    Full therapy note has been documented and is under restricted viewing.  Please see below for summary of today's session.     Patient Name: Hilton Lemos  Patient MRN: [id number]  Today's Date:  12/19/23    Type of session: intake assessment  Session start time: 11  Session stop time: 11:45  Length of time spent face to face with patient: 45  Persons in attendance: patient    Diagnoses:   1. Dysthymia       Pt. States that she is struggling in her marriage, as her  is a drinker.  She wants to \"check in\" with her own MH and keep her family well.  Also caring for aging mother with dementia.     is an , they are financially ok.  No abuse in the family.    Pt. Has friends and is connected to [employer] and her [employer]ism.      No H/O of   care other than a previous intake with this writer.    No JUNG. No SI.    Wants to start therapy to explore her life more deeply.  Loves her  and loves being a mom.    Referred to [facility], and [facility].  And can f/u with this writer for support/consultation.        Lindsey Rueda, Ph.D.       "

## 2024-05-07 DIAGNOSIS — R92.30 DENSE BREAST: ICD-10-CM

## 2024-05-07 DIAGNOSIS — Z91.89 AT HIGH RISK FOR BREAST CANCER: ICD-10-CM

## 2024-07-09 DIAGNOSIS — N95.1 VASOMOTOR SYMPTOMS DUE TO MENOPAUSE: ICD-10-CM

## 2024-07-09 RX ORDER — ESTRADIOL 0.04 MG/D
1 PATCH TRANSDERMAL
Qty: 4 PATCH | Refills: 3 | Status: SHIPPED | OUTPATIENT
Start: 2024-07-09

## 2024-09-23 ENCOUNTER — TELEMEDICINE (OUTPATIENT)
Dept: MEDICAL GROUP | Facility: CLINIC | Age: 54
End: 2024-09-23
Payer: MEDICAID

## 2024-09-23 DIAGNOSIS — Z79.890 POST-MENOPAUSE ON HRT (HORMONE REPLACEMENT THERAPY): ICD-10-CM

## 2024-09-23 RX ORDER — PROGESTERONE 100 MG/1
100 CAPSULE ORAL DAILY
Qty: 90 CAPSULE | Refills: 2 | Status: SHIPPED | OUTPATIENT
Start: 2024-09-23

## 2024-09-23 NOTE — PROGRESS NOTES
Telemedicine Video Visit: Established Patient   This Remote Face to Face encounter was conducted via Zoom. Verbal consent to telehealth, risks, benefits, and consequences were discussed. Patient retains the right to withdraw at any time. All existing confidentiality protections apply. The patient has access to all transmitted medical information. No dissemination of any patient images or information to other entities without further written consent.    Subjective:   Geographic location:  [address] [place] [address] [address] [place] [address]     Hilton Lemos is a 53 y.o. female presenting for evaluation and management of:    Hilton is interested in starting hormone replacement therapy.  She has tried the estrogen patch in the past as well as progesterone pills as she currently has a uterus.  She states that the progesterone pills at the time made her dizzy.  She was taking them in the morning.  She is not very interested in trying an IUD at this time for progesterone therapy.  Otherwise feels she was tolerating the estrogen well.    ROS   Denies any recent fevers or chills. No nausea or vomiting. No chest pains or shortness of breath. No other symptoms.     No Known Allergies    Current medicines (including changes today)  Current Outpatient Medications   Medication Sig Dispense Refill    progesterone (PROMETRIUM) 100 MG Cap Take 1 Capsule by mouth every day. 90 Capsule 2    estradiol (CLIMARA) 0.0375 MG/24HR patch Place 1 Patch on the skin every 7 days. 4 Patch 3    albuterol 108 (90 Base) MCG/ACT Aero Soln inhalation aerosol Inhale 2 Puffs every 6 hours as needed for Shortness of Breath. 18 g 3    atorvastatin (LIPITOR) 40 MG Tab Take 1 Tablet by mouth every day. 1 po daily 90 Tablet 3    BREYNA 160-4.5 MCG/ACT Aerosol Inhale 2 Puffs 2 times a day.       No current facility-administered medications for this visit.       Patient Active Problem List    Diagnosis Date Noted    Post-menopause on HRT (hormone replacement  therapy) 09/23/2024    Vasomotor symptoms due to menopause 12/18/2023    Chronic cough 06/28/2022    Other hyperlipidemia 02/24/2022       Family History   Problem Relation Age of Onset    Hyperlipidemia Mother     Dementia Mother     Hyperlipidemia Father        She  has a past medical history of Hyperlipidemia and Pregnant.  She  has a past surgical history that includes gyn surgery (2015); repeat c section (1/4/2018); and primary c section.       Objective:   Vitals not obtained due to virtual visit.     Physical Exam:  Constitutional: Alert, no distress, well-groomed.  Skin: No rashes in visible areas.  Eye: Round. Conjunctiva clear, lids normal. No icterus.   ENMT: Lips pink without lesions, good dentition, moist mucous membranes. Phonation normal.  Neck: No masses. Moves freely without pain.  CV: Pulse as reported by patient  Respiratory: Unlabored respiratory effort, no cough or audible wheeze  Psych: Alert and oriented x3, normal affect and mood.     Assessment and Plan:   The following treatment plan was discussed:     1. Post-menopause on HRT (hormone replacement therapy)    Other orders  - progesterone (PROMETRIUM) 100 MG Cap; Take 1 Capsule by mouth every day.  Dispense: 90 Capsule; Refill: 2    Start progesterone 100mg nightly, continue Estrogen patches as prescribed. Follow up in 1-2 months to recheck efficacy and symptoms.     Follow-up: Return in about 1 month (around 10/23/2024).

## 2025-01-07 DIAGNOSIS — N95.1 VASOMOTOR SYMPTOMS DUE TO MENOPAUSE: ICD-10-CM

## 2025-01-07 RX ORDER — ESTRADIOL 0.04 MG/D
1 PATCH TRANSDERMAL
Qty: 4 PATCH | Refills: 3 | Status: SHIPPED | OUTPATIENT
Start: 2025-01-07

## 2025-01-07 NOTE — TELEPHONE ENCOUNTER
Received request via: Pharmacy    Was the patient seen in the last year in this department? Yes    Does the patient have an active prescription (recently filled or refills available) for medication(s) requested? No    [facility]    Does the patient have group home Plus and need 100-day supply? (This applies to ALL medications) Patient does not have SCP

## 2025-01-11 ENCOUNTER — PATIENT MESSAGE (OUTPATIENT)
Dept: MEDICAL GROUP | Facility: CLINIC | Age: 55
End: 2025-01-11
Payer: MEDICAID

## 2025-01-14 ENCOUNTER — PATIENT MESSAGE (OUTPATIENT)
Dept: MEDICAL GROUP | Facility: CLINIC | Age: 55
End: 2025-01-14
Payer: MEDICAID

## 2025-03-05 ENCOUNTER — APPOINTMENT (OUTPATIENT)
Dept: MEDICAL GROUP | Facility: CLINIC | Age: 55
End: 2025-03-05
Payer: MEDICAID

## 2025-03-05 VITALS
WEIGHT: 173 LBS | OXYGEN SATURATION: 93 % | HEIGHT: 68 IN | SYSTOLIC BLOOD PRESSURE: 129 MMHG | DIASTOLIC BLOOD PRESSURE: 80 MMHG | TEMPERATURE: 98.2 F | HEART RATE: 67 BPM | BODY MASS INDEX: 26.22 KG/M2

## 2025-03-05 DIAGNOSIS — Z79.890 POST-MENOPAUSE ON HRT (HORMONE REPLACEMENT THERAPY): ICD-10-CM

## 2025-03-05 DIAGNOSIS — R53.83 OTHER FATIGUE: ICD-10-CM

## 2025-03-05 DIAGNOSIS — E78.49 OTHER HYPERLIPIDEMIA: ICD-10-CM

## 2025-03-05 DIAGNOSIS — N95.1 VASOMOTOR SYMPTOMS DUE TO MENOPAUSE: ICD-10-CM

## 2025-03-05 PROCEDURE — 3074F SYST BP LT 130 MM HG: CPT | Performed by: FAMILY MEDICINE

## 2025-03-05 PROCEDURE — 99215 OFFICE O/P EST HI 40 MIN: CPT | Performed by: FAMILY MEDICINE

## 2025-03-05 PROCEDURE — 3079F DIAST BP 80-89 MM HG: CPT | Performed by: FAMILY MEDICINE

## 2025-03-05 RX ORDER — ESTRADIOL 0.04 MG/D
1 PATCH TRANSDERMAL
Qty: 12 PATCH | Refills: 3 | Status: SHIPPED | OUTPATIENT
Start: 2025-03-05

## 2025-03-05 RX ORDER — PROGESTERONE 100 MG/1
100 CAPSULE ORAL DAILY
Qty: 90 CAPSULE | Refills: 3 | Status: SHIPPED | OUTPATIENT
Start: 2025-03-05

## 2025-03-05 NOTE — PROGRESS NOTES
HPI:  Patient is a 54 y.o. female. This pleasant female routinely sees myself as her PCP.  She is here today to discuss menopausal symptoms.    Her last menstrual period was 2 years ago.She has had no vaginal bleeding. She denies currently breastfeeding, giving birth in the last six weeks, or possibly being pregnant. She is currently sexually active. Other medications she takes include albuterol, atorvastatin, breyna, estradiol 0.0375 mg/24 hour per week and progesterone 100 mg po qhs.    She currently has essentially no symptoms of hot flashes, night sweats, sleeping difficulties, difficulty falling or staying asleep, headaches, bladder issues, vaginal discomfort, sexual problems, brain fog, poor memory, difficulty concentrating, moodiness, irritation.  She is exhausted in the afternoons. She has been caring for her mother with Alzheimer's but her mothr recently was placed in a memory care center in [place].  She also has two schoolaged children. Her  is a recovering alcoholic and she feels like she cares for him also    Her allergies, surgeries, and vital signs were reviewed.     She denies liver disease, liver cancer, hepatitis, migraines, diabetes, or hypertension. She denies a history of blood clots in leg, lung, or elsewhere.  She denies having a health care provider tell her that she is at high risk of developing leg or lung clots. She denies blood disorder, chest pain or coronary heart disease. She denies ever having a stroke or weakness in a specific arm or leg.     Patient's mood is good. She denies anxiety or depression.     In the past a health care provider has not informed her to not take hormones.  She has used hormone therapy in the past. She has not used pellet therapy in the past. She denies a history of breast cancer, endometrial hyperplasia, estrogen-sensitive cancer or gynecologic cancer (ovarian, cervical, uterine, endometrial)    She has not had a hysterectomy.     She has not  "smoked or vaped in the past.     Family history is positive for father having significant for stroke.  She has been tested for BRCA mutations by buccal swab at [facility] and patient reports it was negative..     Recent testing.  She has had a pap within 2 years. The most recent pap result showed normal with negative high-risk HPV.  Most recent mammogram was 7/2024 and showed negative.  Her colon cancer screening is normal up to date.      Colonoscopy 4/2022 was negative. Repeat colonoscopy due 4/2032.                                                                                                                        Patient Active Problem List    Diagnosis Date Noted    Post-menopause on HRT (hormone replacement therapy) 09/23/2024    Vasomotor symptoms due to menopause 12/18/2023    Chronic cough 06/28/2022    Other hyperlipidemia 02/24/2022       Current Outpatient Medications   Medication Sig Dispense Refill    estradiol (CLIMARA) 0.0375 MG/24HR patch Place 1 Patch on the skin every 7 days. 12 Patch 3    progesterone (PROMETRIUM) 100 MG Cap Take 1 Capsule by mouth every day. 90 Capsule 3    albuterol 108 (90 Base) MCG/ACT Aero Soln inhalation aerosol Inhale 2 Puffs every 6 hours as needed for Shortness of Breath. 18 g 3    atorvastatin (LIPITOR) 40 MG Tab Take 1 Tablet by mouth every day. 1 po daily 90 Tablet 3    BREYNA 160-4.5 MCG/ACT Aerosol Inhale 2 Puffs 2 times a day.       No current facility-administered medications for this visit.         Allergies as of 03/05/2025    (No Known Allergies)          /80 (BP Location: Right arm, Patient Position: Sitting, BP Cuff Size: Adult)   Pulse 67   Temp 36.8 °C (98.2 °F) (Temporal)   Ht 1.727 m (5' 8\")   Wt 78.5 kg (173 lb)   SpO2 93%   BMI 26.30 kg/m²     Gen: no fevers/chills. Feels well.   Eyes: no changes in vision  ENT: no sore throat, no hearing loss, no bloody nose  Pulm: no sob, no cough  CV: no chest pain, no palpitations  GI: no nausea/vomiting, no " diarrhea  : no dysuria or flank pain  MSk: no myalgias  Skin: no rash  Psych: no change in mood   Neuro: no headaches, no numbness/tingling  Heme/Lymph: no easy bruising or bleeding    Physical Exam:  Gen:         Alert and oriented, No apparent distress.  Neck:        No Lymphadenopathy or Bruits.  Lungs:     Clear to auscultation bilaterally  CV:           Regular rate and rhythm. No murmurs, rubs or gallops.    Abdomen: soft, nontender, nondistended.    Skin:      no rash or exudate             Ext:          No clubbing, cyanosis, edema.      Assessment and Plan    54 y.o. female with the following-    1. Post-menopause on HRT (hormone replacement therapy)  CBC WITHOUT DIFFERENTIAL    progesterone (PROMETRIUM) 100 MG Cap      2. Vasomotor symptoms due to menopause  estradiol (CLIMARA) 0.0375 MG/24HR patch    progesterone (PROMETRIUM) 100 MG Cap      3. Other hyperlipidemia  Comp Metabolic Panel    Lipid Profile      4. Other fatigue  CBC WITHOUT DIFFERENTIAL    Comp Metabolic Panel    TSH WITH REFLEX TO FT4          Problem List Items Addressed This Visit       Other hyperlipidemia    Relevant Orders    Comp Metabolic Panel    Lipid Profile    Vasomotor symptoms due to menopause    Relevant Medications    estradiol (CLIMARA) 0.0375 MG/24HR patch    progesterone (PROMETRIUM) 100 MG Cap    Post-menopause on HRT (hormone replacement therapy)    Chronic condition. Controlled.  Doing well.  No changes to medications needed at this time.  Refills sent.  No vaginal dryness      Advice given to the patient:   How to use the estrogen patch:  Apply the patch twice a week, on the same days every week.  Example: If you put a patch on a Monday, change it on Thursday, and then again on the following Monday.  Apply the patch to a hairless area or skin below the waist, such a a thigh or buttocks. Per Menopause society 2022 guidelines (adapted from MARISOL 2013 article), the increased absolute risk associated with  estrogen-progesterone therapy (EPT) and estrogen alone therapy are rare (less than 10/10,000 woman/years) and include increased risk for venous thromboembolism and gallbladder disease.  EPT carries a rare increased risk for stroke and breast cancer, and if estrogen is inadequately opposed, an increased risk of endometrial hyperplasia and endometrial cancer.  Absolute risks are reduced for all-cause mortality, fracture, diabetes, and breast cancer in women aged younger than 60 years.     2. Continuous progesterone use: Take the progesterone pill by mouth in the evening. This helps prevent excessive growth of the uterine lining and reduces the risk of endometrial cancer in women with a uterus who are on estrogen therapy.  Some women find that it also helps with sleep.    3. Other screening tests:  Remember, our goal is to support you in managing your menopause symptoms and ensuring your well-being.  It is important that you have a routine pap smears, mammograms, and colon cancer screening.  We also urge you to stay up to date on vaccines. Labs ordered: CBC, CMP, lipid, and TSH.         Relevant Medications    progesterone (PROMETRIUM) 100 MG Cap    Other Relevant Orders    CBC WITHOUT DIFFERENTIAL     Other Visit Diagnoses         Other fatigue        Relevant Orders    CBC WITHOUT DIFFERENTIAL    Comp Metabolic Panel    TSH WITH REFLEX TO FT4            Labs per orders. CBC, cMP, TSH, lipids ordered.    Immunizations per orders    I spent a total of 45 minutes on the date of the encounter, including preparation (review of test results and history), evaluation and examination, patient counseling and education, ordering tests and medications, coordinating care, communicating with other providers, and documenting in the health record.     Return in about 3 months (around 6/5/2025).

## 2025-03-05 NOTE — ASSESSMENT & PLAN NOTE
Chronic condition. Controlled.  Doing well.  No changes to medications needed at this time.  Refills sent.  No vaginal dryness      Advice given to the patient:   How to use the estrogen patch:  Apply the patch twice a week, on the same days every week.  Example: If you put a patch on a Monday, change it on Thursday, and then again on the following Monday.  Apply the patch to a hairless area or skin below the waist, such a a thigh or buttocks. Per Menopause society 2022 guidelines (adapted from MARISOL 2013 article), the increased absolute risk associated with estrogen-progesterone therapy (EPT) and estrogen alone therapy are rare (less than 10/10,000 woman/years) and include increased risk for venous thromboembolism and gallbladder disease.  EPT carries a rare increased risk for stroke and breast cancer, and if estrogen is inadequately opposed, an increased risk of endometrial hyperplasia and endometrial cancer.  Absolute risks are reduced for all-cause mortality, fracture, diabetes, and breast cancer in women aged younger than 60 years.     2. Continuous progesterone use: Take the progesterone pill by mouth in the evening. This helps prevent excessive growth of the uterine lining and reduces the risk of endometrial cancer in women with a uterus who are on estrogen therapy.  Some women find that it also helps with sleep.    3. Other screening tests:  Remember, our goal is to support you in managing your menopause symptoms and ensuring your well-being.  It is important that you have a routine pap smears, mammograms, and colon cancer screening.  We also urge you to stay up to date on vaccines. Labs ordered: CBC, CMP, lipid, and TSH.

## 2025-03-06 ENCOUNTER — PATIENT MESSAGE (OUTPATIENT)
Dept: MEDICAL GROUP | Facility: CLINIC | Age: 55
End: 2025-03-06
Payer: MEDICAID

## 2025-03-06 ENCOUNTER — HOSPITAL ENCOUNTER (OUTPATIENT)
Dept: LAB | Facility: MEDICAL CENTER | Age: 55
End: 2025-03-06
Attending: FAMILY MEDICINE
Payer: MEDICAID

## 2025-03-06 DIAGNOSIS — E78.49 OTHER HYPERLIPIDEMIA: ICD-10-CM

## 2025-03-06 DIAGNOSIS — Z12.39 ENCOUNTER FOR SCREENING FOR MALIGNANT NEOPLASM OF BREAST, UNSPECIFIED SCREENING MODALITY: ICD-10-CM

## 2025-03-06 DIAGNOSIS — R92.30 DENSE BREAST: ICD-10-CM

## 2025-03-06 DIAGNOSIS — Z79.890 POST-MENOPAUSE ON HRT (HORMONE REPLACEMENT THERAPY): ICD-10-CM

## 2025-03-06 DIAGNOSIS — R53.83 OTHER FATIGUE: ICD-10-CM

## 2025-03-06 LAB
ALBUMIN SERPL BCP-MCNC: 4.2 G/DL (ref 3.2–4.9)
ALBUMIN/GLOB SERPL: 1.5 G/DL
ALP SERPL-CCNC: 70 U/L (ref 30–99)
ALT SERPL-CCNC: 16 U/L (ref 2–50)
ANION GAP SERPL CALC-SCNC: 11 MMOL/L (ref 7–16)
AST SERPL-CCNC: 23 U/L (ref 12–45)
BILIRUB SERPL-MCNC: 0.8 MG/DL (ref 0.1–1.5)
BUN SERPL-MCNC: 14 MG/DL (ref 8–22)
CALCIUM ALBUM COR SERPL-MCNC: 9.2 MG/DL (ref 8.5–10.5)
CALCIUM SERPL-MCNC: 9.4 MG/DL (ref 8.5–10.5)
CHLORIDE SERPL-SCNC: 104 MMOL/L (ref 96–112)
CHOLEST SERPL-MCNC: 169 MG/DL (ref 100–199)
CO2 SERPL-SCNC: 25 MMOL/L (ref 20–33)
CREAT SERPL-MCNC: 0.9 MG/DL (ref 0.5–1.4)
ERYTHROCYTE [DISTWIDTH] IN BLOOD BY AUTOMATED COUNT: 42.9 FL (ref 35.9–50)
FASTING STATUS PATIENT QL REPORTED: NORMAL
GFR SERPLBLD CREATININE-BSD FMLA CKD-EPI: 76 ML/MIN/1.73 M 2
GLOBULIN SER CALC-MCNC: 2.8 G/DL (ref 1.9–3.5)
GLUCOSE SERPL-MCNC: 81 MG/DL (ref 65–99)
HCT VFR BLD AUTO: 45.2 % (ref 37–47)
HDLC SERPL-MCNC: 48 MG/DL
HGB BLD-MCNC: 14.5 G/DL (ref 12–16)
LDLC SERPL CALC-MCNC: 98 MG/DL
MCH RBC QN AUTO: 29.4 PG (ref 27–33)
MCHC RBC AUTO-ENTMCNC: 32.1 G/DL (ref 32.2–35.5)
MCV RBC AUTO: 91.5 FL (ref 81.4–97.8)
PLATELET # BLD AUTO: 168 K/UL (ref 164–446)
PMV BLD AUTO: 11.3 FL (ref 9–12.9)
POTASSIUM SERPL-SCNC: 4.4 MMOL/L (ref 3.6–5.5)
PROT SERPL-MCNC: 7 G/DL (ref 6–8.2)
RBC # BLD AUTO: 4.94 M/UL (ref 4.2–5.4)
SODIUM SERPL-SCNC: 140 MMOL/L (ref 135–145)
TRIGL SERPL-MCNC: 116 MG/DL (ref 0–149)
TSH SERPL DL<=0.005 MIU/L-ACNC: 3.97 UIU/ML (ref 0.38–5.33)
WBC # BLD AUTO: 7.3 K/UL (ref 4.8–10.8)

## 2025-03-06 PROCEDURE — 80061 LIPID PANEL: CPT

## 2025-03-06 PROCEDURE — 85027 COMPLETE CBC AUTOMATED: CPT

## 2025-03-06 PROCEDURE — 36415 COLL VENOUS BLD VENIPUNCTURE: CPT

## 2025-03-06 PROCEDURE — 80053 COMPREHEN METABOLIC PANEL: CPT

## 2025-03-06 PROCEDURE — 84443 ASSAY THYROID STIM HORMONE: CPT

## 2025-03-07 ENCOUNTER — RESULTS FOLLOW-UP (OUTPATIENT)
Dept: MEDICAL GROUP | Facility: CLINIC | Age: 55
End: 2025-03-07
Payer: MEDICAID

## 2025-03-11 DIAGNOSIS — J98.01 BRONCHOSPASM: ICD-10-CM

## 2025-03-11 NOTE — TELEPHONE ENCOUNTER
Received request via: Pharmacy    Was the patient seen in the last year in this department? Yes    Does the patient have an active prescription (recently filled or refills available) for medication(s) requested? No    Pharmacy Name: [facility] pharmacy     Does the patient have [facility] and need 100-day supply? (This applies to ALL medications) Patient does not have SCP

## 2025-03-12 RX ORDER — ATORVASTATIN CALCIUM 40 MG/1
40 TABLET, FILM COATED ORAL DAILY
Qty: 90 TABLET | Refills: 3 | Status: SHIPPED | OUTPATIENT
Start: 2025-03-12

## 2025-03-12 RX ORDER — ALBUTEROL SULFATE 90 UG/1
2 INHALANT RESPIRATORY (INHALATION) EVERY 6 HOURS PRN
Qty: 18 G | Refills: 3 | Status: SHIPPED | OUTPATIENT
Start: 2025-03-12

## 2025-03-17 ENCOUNTER — PATIENT MESSAGE (OUTPATIENT)
Dept: MEDICAL GROUP | Facility: CLINIC | Age: 55
End: 2025-03-17
Payer: MEDICAID

## 2025-03-22 ENCOUNTER — PATIENT MESSAGE (OUTPATIENT)
Dept: MEDICAL GROUP | Facility: CLINIC | Age: 55
End: 2025-03-22
Payer: MEDICAID

## 2025-04-07 ENCOUNTER — PATIENT MESSAGE (OUTPATIENT)
Dept: MEDICAL GROUP | Facility: CLINIC | Age: 55
End: 2025-04-07
Payer: MEDICAID

## 2025-04-07 DIAGNOSIS — E78.49 OTHER HYPERLIPIDEMIA: ICD-10-CM

## 2025-04-08 ENCOUNTER — PATIENT MESSAGE (OUTPATIENT)
Dept: MEDICAL GROUP | Facility: CLINIC | Age: 55
End: 2025-04-08
Payer: MEDICAID

## 2025-04-08 DIAGNOSIS — R92.30 DENSE BREAST: ICD-10-CM

## 2025-04-08 DIAGNOSIS — R53.83 OTHER FATIGUE: ICD-10-CM

## 2025-04-15 NOTE — Clinical Note
REFERRAL APPROVAL NOTICE         Sent on April 15, 2025                   Hilton Lemos  [address]   [place] [address]                   Dear Ms. Lemos,    After a careful review of the medical information and benefit coverage, [facility] has processed your referral. See below for additional details.    If applicable, you must be actively enrolled with your insurance for coverage of the authorized service. If you have any questions regarding your coverage, please contact your insurance directly.    REFERRAL INFORMATION   Referral #:  [id number]  Referred-To Department    Referred-By Provider:  Endocrinology    Angely Moore M.D.   Endocrinology [facility]      [address]  [facility] [address]  [phone] [address]  [facility] [id number] [phone]    Referral Start Date:  04/10/2025  Referral End Date:   04/10/2026           SCHEDULING  If you do not already have an appointment, please call [phone] to make an appointment.   MORE INFORMATION  As a reminder, [facility] ownership has changed, meaning this location is now owned and operated by [facility]. As such, we want to clarify that our patients should expect to receive two separate bills for the services received at [facility] - one representing the [facility] facility fees as the owner of the establishment, and the other to represent the physician's services and subsequent fees. You can speak with your insurance carrier for a pricing estimate by calling the customer service number on the back of your card and ask about charges for a hospital outpatient visit.  If you do not already have a [employer] account, sign up at: [email or web address]  You can access your medical information, make appointments, see lab results, billing information, and more.  If you have questions regarding this referral, please contact  the [facility] Referrals department at:              [phone]. Monday - Friday 7:30AM - 5:00PM.      Sincerely,  [facility]

## 2025-04-16 DIAGNOSIS — R92.30 DENSE BREAST: ICD-10-CM

## 2025-04-16 DIAGNOSIS — Z79.890 POST-MENOPAUSE ON HRT (HORMONE REPLACEMENT THERAPY): ICD-10-CM

## 2025-04-22 DIAGNOSIS — Z91.89 AT HIGH RISK FOR BREAST CANCER: ICD-10-CM

## 2025-04-22 DIAGNOSIS — Z12.39 ENCOUNTER FOR SCREENING FOR MALIGNANT NEOPLASM OF BREAST, UNSPECIFIED SCREENING MODALITY: ICD-10-CM

## 2025-04-22 DIAGNOSIS — R92.30 DENSE BREAST: ICD-10-CM

## 2025-05-02 ENCOUNTER — PATIENT MESSAGE (OUTPATIENT)
Dept: MEDICAL GROUP | Facility: CLINIC | Age: 55
End: 2025-05-02
Payer: MEDICAID

## 2025-05-09 ENCOUNTER — TELEPHONE (OUTPATIENT)
Dept: ENDOCRINOLOGY | Facility: MEDICAL CENTER | Age: 55
End: 2025-05-09
Payer: MEDICAID

## 2025-05-09 NOTE — TELEPHONE ENCOUNTER
Pt left  vm wanting to schedule from referral. Called pt and left vm letting pt know that referral was to [facility] and our office does require an internal [facility] referral from PCP to schedule an appt

## 2025-06-02 ENCOUNTER — PATIENT MESSAGE (OUTPATIENT)
Dept: MEDICAL GROUP | Facility: CLINIC | Age: 55
End: 2025-06-02
Payer: MEDICAID

## 2025-06-03 ENCOUNTER — PATIENT MESSAGE (OUTPATIENT)
Dept: MEDICAL GROUP | Facility: CLINIC | Age: 55
End: 2025-06-03
Payer: MEDICAID

## 2025-06-03 DIAGNOSIS — E78.49 OTHER HYPERLIPIDEMIA: Primary | ICD-10-CM

## 2025-06-06 ENCOUNTER — PATIENT MESSAGE (OUTPATIENT)
Dept: MEDICAL GROUP | Facility: CLINIC | Age: 55
End: 2025-06-06
Payer: MEDICAID

## 2025-06-12 ENCOUNTER — TELEPHONE (OUTPATIENT)
Dept: HEALTH INFORMATION MANAGEMENT | Facility: OTHER | Age: 55
End: 2025-06-12
Payer: MEDICAID

## 2025-06-12 NOTE — Clinical Note
REFERRAL APPROVAL NOTICE         Sent on June 12, 2025                   Hilton Lemos  [address] [place] [address]                   Dear Ms. Lemos,    After a careful review of the medical information and benefit coverage, [facility] has processed your referral. See below for additional details.    If applicable, you must be actively enrolled with your insurance for coverage of the authorized service. If you have any questions regarding your coverage, please contact your insurance directly.    REFERRAL INFORMATION   Referral #:  [id number]  Referred-To Department    Referred-By Provider:  Vascular Medicine    Angely Moore M.D.   Vascular Medicine      [address] [facility]  [place] [address]  [phone] [address] [place] [address]  [place] [address]  [phone]    Referral Start Date:  06/03/2025  Referral End Date:   06/03/2026             SCHEDULING  If you do not already have an appointment, please call [phone] to make an appointment.     MORE INFORMATION  If you do not already have a [employer] account, sign up at: [email or web address]  You can access your medical information, make appointments, see lab results, billing information, and more.  If you have questions regarding this referral, please contact  the [facility] Referrals department at:             [phone]. Monday - Friday 8:00AM - 5:00PM.     Sincerely,    [facility]

## 2025-06-25 DIAGNOSIS — J98.01 BRONCHOSPASM: ICD-10-CM

## 2025-06-25 RX ORDER — ALBUTEROL SULFATE 90 UG/1
2 INHALANT RESPIRATORY (INHALATION) EVERY 6 HOURS PRN
Qty: 18 EACH | Refills: 3 | Status: SHIPPED | OUTPATIENT
Start: 2025-06-25

## 2025-08-15 ENCOUNTER — OFFICE VISIT (OUTPATIENT)
Dept: MEDICAL GROUP | Facility: CLINIC | Age: 55
End: 2025-08-15
Payer: MEDICAID

## 2025-08-15 VITALS
HEIGHT: 68 IN | SYSTOLIC BLOOD PRESSURE: 112 MMHG | TEMPERATURE: 97.2 F | BODY MASS INDEX: 24.25 KG/M2 | OXYGEN SATURATION: 98 % | HEART RATE: 64 BPM | DIASTOLIC BLOOD PRESSURE: 72 MMHG | WEIGHT: 160 LBS

## 2025-08-15 DIAGNOSIS — E78.49 OTHER HYPERLIPIDEMIA: ICD-10-CM

## 2025-08-15 DIAGNOSIS — Z00.00 WELL ADULT EXAM: ICD-10-CM

## 2025-08-15 DIAGNOSIS — L98.9 SKIN LESION OF FACE: ICD-10-CM

## 2025-08-15 DIAGNOSIS — R05.3 CHRONIC COUGH: Primary | ICD-10-CM

## 2025-08-15 DIAGNOSIS — J98.01 BRONCHOSPASM: ICD-10-CM

## 2025-08-15 DIAGNOSIS — Z79.890 POST-MENOPAUSE ON HRT (HORMONE REPLACEMENT THERAPY): ICD-10-CM

## 2025-08-15 RX ORDER — ESTRADIOL 0.1 MG/G
CREAM VAGINAL
Qty: 42.5 G | Refills: 9 | Status: SHIPPED | OUTPATIENT
Start: 2025-08-15

## 2025-08-15 RX ORDER — BUDESONIDE AND FORMOTEROL FUMARATE 160; 4.5 UG/1; UG/1
2 AEROSOL, METERED RESPIRATORY (INHALATION) 2 TIMES DAILY
Qty: 1 EACH | Refills: 11 | Status: SHIPPED | OUTPATIENT
Start: 2025-08-15

## 2025-08-15 RX ORDER — ATORVASTATIN CALCIUM 40 MG/1
40 TABLET, FILM COATED ORAL DAILY
Qty: 90 TABLET | Refills: 3 | Status: SHIPPED | OUTPATIENT
Start: 2025-08-15

## 2025-08-15 RX ORDER — ALBUTEROL SULFATE 90 UG/1
2 INHALANT RESPIRATORY (INHALATION) EVERY 6 HOURS PRN
Qty: 18 EACH | Refills: 3 | Status: SHIPPED | OUTPATIENT
Start: 2025-08-15

## 2025-08-15 ASSESSMENT — FIBROSIS 4 INDEX: FIB4 SCORE: 1.85

## 2025-08-15 ASSESSMENT — PATIENT HEALTH QUESTIONNAIRE - PHQ9: CLINICAL INTERPRETATION OF PHQ2 SCORE: 0

## 2025-08-25 ENCOUNTER — TELEPHONE (OUTPATIENT)
Dept: VASCULAR LAB | Facility: MEDICAL CENTER | Age: 55
End: 2025-08-25
Payer: MEDICAID

## (undated) DEVICE — GLOVE BIOGEL SZ 8.5 SURGICAL PF LTX - (50PR/BX 4BX/CA)

## (undated) DEVICE — CLOSURE SKIN STRIP 1/2 X 4 IN - (STERI STRIP) (50/BX 4BX/CA)

## (undated) DEVICE — SODIUM CHL IRRIGATION 0.9% 1000ML (12EA/CA)

## (undated) DEVICE — PACK C-SECTION (2EA/CA)

## (undated) DEVICE — HEAD HOLDER JUNIOR/ADULT

## (undated) DEVICE — DETERGENT RENUZYME PLUS 10 OZ PACKET (50/BX)

## (undated) DEVICE — RETRACTOR O C SECTION LRY - (5/BX)

## (undated) DEVICE — TUBING CLEARLINK DUO-VENT - C-FLO (48EA/CA)

## (undated) DEVICE — ELECTRODE DUAL RETURN W/ CORD - (50/PK)

## (undated) DEVICE — WATER IRRIG. STER. 1500 ML - (9/CA)

## (undated) DEVICE — SUTURE 2-0 VICRYL PLUS CT-1 36 (36PK/BX)"

## (undated) DEVICE — TRAY SPINAL ANESTHESIA NON-SAFETY (10/CA)

## (undated) DEVICE — KIT  I.V. START (100EA/CA)

## (undated) DEVICE — SUTURE 3-0 VICRYL PLUS CT-1 - 36 INCH (36/BX)

## (undated) DEVICE — SUTURE 4-0 VICRYL PLUSFS-1 - 27 INCH (36/BX)

## (undated) DEVICE — CATHETER IV NON-SAFETY 18 GA X 1 1/4 (50/BX 4BX/CA)

## (undated) DEVICE — SUTURE 0 36IN PDS + VIO CT-1 (36PK/BX)

## (undated) DEVICE — SET EXTENSION WITH 2 PORTS (48EA/CA) ***PART #2C8610 IS A SUBSTITUTE*****

## (undated) DEVICE — SUTURE 0 VICRYL PLUS CT-1 - 36 INCH (36/BX)